# Patient Record
Sex: FEMALE | Race: WHITE | NOT HISPANIC OR LATINO | Employment: FULL TIME | URBAN - NONMETROPOLITAN AREA
[De-identification: names, ages, dates, MRNs, and addresses within clinical notes are randomized per-mention and may not be internally consistent; named-entity substitution may affect disease eponyms.]

---

## 2017-07-07 ENCOUNTER — TRANSCRIBE ORDERS (OUTPATIENT)
Dept: ADMINISTRATIVE | Facility: HOSPITAL | Age: 37
End: 2017-07-07

## 2017-07-07 DIAGNOSIS — N63.0 BREAST LUMP: Primary | ICD-10-CM

## 2017-07-11 ENCOUNTER — HOSPITAL ENCOUNTER (OUTPATIENT)
Dept: ULTRASOUND IMAGING | Facility: HOSPITAL | Age: 37
Discharge: HOME | End: 2017-07-11
Attending: OBSTETRICS & GYNECOLOGY | Admitting: OBSTETRICS & GYNECOLOGY

## 2017-07-11 DIAGNOSIS — N63.0 BREAST LUMP: ICD-10-CM

## 2017-07-11 PROCEDURE — 76642 ULTRASOUND BREAST LIMITED: CPT

## 2017-07-11 PROCEDURE — 76642 ULTRASOUND BREAST LIMITED: CPT | Performed by: RADIOLOGY

## 2017-07-12 ENCOUNTER — HOSPITAL ENCOUNTER (EMERGENCY)
Facility: HOSPITAL | Age: 37
Discharge: HOME | End: 2017-07-12
Attending: EMERGENCY MEDICINE | Admitting: EMERGENCY MEDICINE

## 2017-07-12 ENCOUNTER — APPOINTMENT (OUTPATIENT)
Dept: CT IMAGING | Facility: HOSPITAL | Age: 37
End: 2017-07-12

## 2017-07-12 VITALS
DIASTOLIC BLOOD PRESSURE: 74 MMHG | OXYGEN SATURATION: 98 % | HEIGHT: 63 IN | BODY MASS INDEX: 19.49 KG/M2 | TEMPERATURE: 97.8 F | WEIGHT: 110 LBS | RESPIRATION RATE: 16 BRPM | HEART RATE: 88 BPM | SYSTOLIC BLOOD PRESSURE: 112 MMHG

## 2017-07-12 DIAGNOSIS — G44.209 TENSION HEADACHE: Primary | ICD-10-CM

## 2017-07-12 DIAGNOSIS — G24.5 BLEPHAROSPASM: ICD-10-CM

## 2017-07-12 LAB
6-ACETYL MORPHINE: NEGATIVE
ALBUMIN SERPL-MCNC: 5 G/DL (ref 3.5–5)
ALBUMIN/GLOB SERPL: 1.7 G/DL (ref 1.5–2.5)
ALP SERPL-CCNC: 73 U/L (ref 35–104)
ALT SERPL W P-5'-P-CCNC: 19 U/L (ref 10–36)
AMPHET+METHAMPHET UR QL: NEGATIVE
ANION GAP SERPL CALCULATED.3IONS-SCNC: 5.3 MMOL/L (ref 3.6–11.2)
AST SERPL-CCNC: 17 U/L (ref 10–30)
B-HCG UR QL: NEGATIVE
BACTERIA UR QL AUTO: ABNORMAL /HPF
BARBITURATES UR QL SCN: NEGATIVE
BASOPHILS # BLD AUTO: 0.02 10*3/MM3 (ref 0–0.3)
BASOPHILS NFR BLD AUTO: 0.2 % (ref 0–2)
BENZODIAZ UR QL SCN: NEGATIVE
BILIRUB SERPL-MCNC: 0.9 MG/DL (ref 0.2–1.8)
BILIRUB UR QL STRIP: NEGATIVE
BUN BLD-MCNC: 8 MG/DL (ref 7–21)
BUN/CREAT SERPL: 10.7 (ref 7–25)
BUPRENORPHINE SERPL-MCNC: NEGATIVE NG/ML
CALCIUM SPEC-SCNC: 9.8 MG/DL (ref 7.7–10)
CANNABINOIDS SERPL QL: NEGATIVE
CHLORIDE SERPL-SCNC: 106 MMOL/L (ref 99–112)
CLARITY UR: CLEAR
CO2 SERPL-SCNC: 29.7 MMOL/L (ref 24.3–31.9)
COCAINE UR QL: NEGATIVE
COLOR UR: YELLOW
CREAT BLD-MCNC: 0.75 MG/DL (ref 0.43–1.29)
DEPRECATED RDW RBC AUTO: 44.2 FL (ref 37–54)
EOSINOPHIL # BLD AUTO: 0.28 10*3/MM3 (ref 0–0.7)
EOSINOPHIL NFR BLD AUTO: 3 % (ref 0–5)
ERYTHROCYTE [DISTWIDTH] IN BLOOD BY AUTOMATED COUNT: 13 % (ref 11.5–14.5)
GFR SERPL CREATININE-BSD FRML MDRD: 87 ML/MIN/1.73
GLOBULIN UR ELPH-MCNC: 3 GM/DL
GLUCOSE BLD-MCNC: 93 MG/DL (ref 70–110)
GLUCOSE UR STRIP-MCNC: NEGATIVE MG/DL
HCT VFR BLD AUTO: 46.2 % (ref 37–47)
HGB BLD-MCNC: 15.8 G/DL (ref 12–16)
HGB UR QL STRIP.AUTO: ABNORMAL
HYALINE CASTS UR QL AUTO: ABNORMAL /LPF
IMM GRANULOCYTES # BLD: 0.02 10*3/MM3 (ref 0–0.03)
IMM GRANULOCYTES NFR BLD: 0.2 % (ref 0–0.5)
KETONES UR QL STRIP: NEGATIVE
LEUKOCYTE ESTERASE UR QL STRIP.AUTO: ABNORMAL
LYMPHOCYTES # BLD AUTO: 2.37 10*3/MM3 (ref 1–3)
LYMPHOCYTES NFR BLD AUTO: 25.2 % (ref 21–51)
MCH RBC QN AUTO: 31.8 PG (ref 27–33)
MCHC RBC AUTO-ENTMCNC: 34.2 G/DL (ref 33–37)
MCV RBC AUTO: 93 FL (ref 80–94)
METHADONE UR QL SCN: NEGATIVE
MONOCYTES # BLD AUTO: 0.51 10*3/MM3 (ref 0.1–0.9)
MONOCYTES NFR BLD AUTO: 5.4 % (ref 0–10)
NEUTROPHILS # BLD AUTO: 6.19 10*3/MM3 (ref 1.4–6.5)
NEUTROPHILS NFR BLD AUTO: 66 % (ref 30–70)
NITRITE UR QL STRIP: NEGATIVE
OPIATES UR QL: NEGATIVE
OSMOLALITY SERPL CALC.SUM OF ELEC: 279.3 MOSM/KG (ref 273–305)
OXYCODONE UR QL SCN: NEGATIVE
PCP UR QL SCN: NEGATIVE
PH UR STRIP.AUTO: 6 [PH] (ref 5–8)
PLATELET # BLD AUTO: 232 10*3/MM3 (ref 130–400)
PMV BLD AUTO: 11.7 FL (ref 6–10)
POTASSIUM BLD-SCNC: 4 MMOL/L (ref 3.5–5.3)
PROT SERPL-MCNC: 8 G/DL (ref 6–8)
PROT UR QL STRIP: NEGATIVE
RBC # BLD AUTO: 4.97 10*6/MM3 (ref 4.2–5.4)
RBC # UR: ABNORMAL /HPF
REF LAB TEST METHOD: ABNORMAL
SODIUM BLD-SCNC: 141 MMOL/L (ref 135–153)
SP GR UR STRIP: 1.01 (ref 1–1.03)
SQUAMOUS #/AREA URNS HPF: ABNORMAL /HPF
UROBILINOGEN UR QL STRIP: ABNORMAL
WBC NRBC COR # BLD: 9.39 10*3/MM3 (ref 4.5–12.5)
WBC UR QL AUTO: ABNORMAL /HPF

## 2017-07-12 PROCEDURE — 80307 DRUG TEST PRSMV CHEM ANLYZR: CPT | Performed by: PHYSICIAN ASSISTANT

## 2017-07-12 PROCEDURE — 81001 URINALYSIS AUTO W/SCOPE: CPT | Performed by: PHYSICIAN ASSISTANT

## 2017-07-12 PROCEDURE — 99283 EMERGENCY DEPT VISIT LOW MDM: CPT

## 2017-07-12 PROCEDURE — 81025 URINE PREGNANCY TEST: CPT | Performed by: PHYSICIAN ASSISTANT

## 2017-07-12 PROCEDURE — 85025 COMPLETE CBC W/AUTO DIFF WBC: CPT | Performed by: PHYSICIAN ASSISTANT

## 2017-07-12 PROCEDURE — 70450 CT HEAD/BRAIN W/O DYE: CPT | Performed by: RADIOLOGY

## 2017-07-12 PROCEDURE — 70450 CT HEAD/BRAIN W/O DYE: CPT

## 2017-07-12 PROCEDURE — 80053 COMPREHEN METABOLIC PANEL: CPT | Performed by: PHYSICIAN ASSISTANT

## 2017-07-12 PROCEDURE — 96360 HYDRATION IV INFUSION INIT: CPT

## 2017-07-12 RX ORDER — SODIUM CHLORIDE 0.9 % (FLUSH) 0.9 %
10 SYRINGE (ML) INJECTION AS NEEDED
Status: DISCONTINUED | OUTPATIENT
Start: 2017-07-12 | End: 2017-07-12 | Stop reason: HOSPADM

## 2017-07-12 RX ADMIN — SODIUM CHLORIDE 1000 ML: 0.9 INJECTION, SOLUTION INTRAVENOUS at 18:50

## 2017-07-12 NOTE — ED NOTES
"Patient states \" jewel been under a lot of stress lately, i work full time at [employer] and i have a teenager and a todler at home, I got a headache on Tuesday and i get migraines so i just try to sleep them off but this one didn't go away when i woke up so i took motrin and than my eyes started twitching\". Provider aware.       Pallavi Murguia RN  07/12/17 6412    "

## 2017-07-13 NOTE — DISCHARGE INSTRUCTIONS
Recommend if you keep having the eye twitching to ask your primary care provider to refer you to a Neurologist for further evaluation.

## 2017-07-13 NOTE — ED PROVIDER NOTES
Subjective   Patient is a 36 y.o. female presenting with headaches.   History provided by:  Patient   used: No    Headache   Pain location:  Generalized  Quality:  Dull  Radiates to:  Does not radiate  Duration:  2 days  Timing:  Rare  Progression:  Resolved  Chronicity:  Recurrent  Similar to prior headaches: yes    Context: emotional stress    Context: not activity, not exposure to bright light and not caffeine    Relieved by:  Acetaminophen and NSAIDs  Worsened by:  Nothing  Ineffective treatments:  None tried  Associated symptoms: no abdominal pain, no blurred vision, no cough, no dizziness and no fever    Risk factors: sedentary lifestyle        Review of Systems   Constitutional: Negative.  Negative for fever.   HENT:        (+) eye twitching   Eyes: Negative for blurred vision.   Respiratory: Negative.  Negative for cough.    Cardiovascular: Negative.  Negative for chest pain.   Gastrointestinal: Negative.  Negative for abdominal pain.   Endocrine: Negative.    Genitourinary: Negative.  Negative for dysuria.   Skin: Negative.    Neurological: Positive for headaches. Negative for dizziness.   Psychiatric/Behavioral: Negative.    All other systems reviewed and are negative.      Past Medical History:   Diagnosis Date   • Arthritis    • Asthma    • Cancer    • COPD (chronic obstructive pulmonary disease)    • Heart murmur    • Heart murmur     AS A CHILD   • History of transfusion    • Injury of back    • Ovarian cyst    • Pneumothorax    • Sciatica        Allergies   Allergen Reactions   • Amoxil [Amoxicillin]    • Cephalosporins    • Penicillins        Past Surgical History:   Procedure Laterality Date   • APPENDECTOMY N/A 8/11/2016    Procedure: APPENDECTOMY LAPAROSCOPIC;  Surgeon: Carlos Molina MD;  Location: [facility];  Service:    • CHEST TUBE INSERTION     • DIAGNOSTIC LAPAROSCOPY N/A 8/11/2016    Procedure: DIAGNOSTIC LAPAROSCOPY, LEFT SALPINGOOPHERECTOMY;  Surgeon: Beka Forde  DO Raymundo;  Location: [facility] OR;  Service:    • DILATATION AND CURETTAGE     • GYNECOLOGIC CRYOSURGERY      CERVICAL   • KNEE SURGERY Left    • OTHER SURGICAL HISTORY      external benign vaginal cyst removal   • PILONIDAL CYSTECTOMY     • WISDOM TOOTH EXTRACTION         History reviewed. No pertinent family history.    Social History     Social History   • Marital status:      Spouse name: N/A   • Number of children: N/A   • Years of education: N/A     Social History Main Topics   • Smoking status: Current Every Day Smoker     Packs/day: 1.00     Years: 20.00     Types: Cigarettes   • Smokeless tobacco: None   • Alcohol use No   • Drug use: No      Comment: smoked some pot as a teenager   • Sexual activity: Defer     Other Topics Concern   • None     Social History Narrative           Objective   Physical Exam   Constitutional: She is oriented to person, place, and time. She appears well-developed and well-nourished. No distress.   HENT:   Head: Normocephalic and atraumatic.   Right Ear: External ear normal.   Left Ear: External ear normal.   Nose: Nose normal.   Eyes: Conjunctivae, EOM and lids are normal. Pupils are equal, round, and reactive to light. Lids are everted and swept, no foreign bodies found.   Bilateral eye twitching   Neck: Normal range of motion. Neck supple. No JVD present. No tracheal deviation present.   Cardiovascular: Normal rate, regular rhythm and normal heart sounds.    No murmur heard.  Pulmonary/Chest: Effort normal and breath sounds normal. No respiratory distress. She has no wheezes.   Abdominal: Soft. Bowel sounds are normal. There is no tenderness.   Musculoskeletal: Normal range of motion. She exhibits no edema or deformity.   Neurological: She is alert and oriented to person, place, and time. No cranial nerve deficit.   Skin: Skin is warm and dry. No rash noted. She is not diaphoretic. No erythema. No pallor.   Psychiatric: She has a normal mood and affect. Her behavior is  normal. Thought content normal.   Nursing note and vitals reviewed.      Procedures         ED Course  ED Course   Value Comment By Time   CT Head Without Contrast Per [facility], there is no acute findings. Maite Carrera PA-C 07/12 2025                  [facility]    Final diagnoses:   Tension headache   Blepharospasm            Maite Carrera PA-C  07/13/17 0350

## 2017-07-31 ENCOUNTER — OFFICE VISIT (OUTPATIENT)
Dept: FAMILY MEDICINE CLINIC | Facility: CLINIC | Age: 37
End: 2017-07-31

## 2017-07-31 VITALS
DIASTOLIC BLOOD PRESSURE: 72 MMHG | WEIGHT: 110 LBS | HEIGHT: 63 IN | HEART RATE: 94 BPM | BODY MASS INDEX: 19.49 KG/M2 | SYSTOLIC BLOOD PRESSURE: 118 MMHG | OXYGEN SATURATION: 99 %

## 2017-07-31 DIAGNOSIS — G43.709 CHRONIC MIGRAINE WITHOUT AURA WITHOUT STATUS MIGRAINOSUS, NOT INTRACTABLE: ICD-10-CM

## 2017-07-31 DIAGNOSIS — J30.89 SEASONAL ALLERGIC RHINITIS DUE TO OTHER ALLERGIC TRIGGER: ICD-10-CM

## 2017-07-31 DIAGNOSIS — Z86.79 HISTORY OF HEART MURMUR IN CHILDHOOD: Primary | ICD-10-CM

## 2017-07-31 PROCEDURE — 99204 OFFICE O/P NEW MOD 45 MIN: CPT | Performed by: FAMILY MEDICINE

## 2017-07-31 RX ORDER — ACETAMINOPHEN, ASPIRIN AND CAFFEINE 250; 250; 65 MG/1; MG/1; MG/1
1 TABLET, FILM COATED ORAL EVERY 6 HOURS PRN
COMMUNITY

## 2017-07-31 RX ORDER — NORGESTREL-ETHINYL ESTRADIOL 0.3-0.03MG
TABLET ORAL
COMMUNITY
Start: 2017-07-13 | End: 2020-11-16

## 2017-07-31 RX ORDER — LORATADINE 10 MG/1
10 TABLET ORAL DAILY
COMMUNITY
End: 2017-09-13

## 2017-07-31 RX ORDER — SUMATRIPTAN 50 MG/1
TABLET, FILM COATED ORAL
Qty: 10 TABLET | Refills: 0 | Status: SHIPPED | OUTPATIENT
Start: 2017-07-31 | End: 2017-10-23 | Stop reason: SDUPTHER

## 2017-07-31 RX ORDER — MONTELUKAST SODIUM 10 MG/1
10 TABLET ORAL NIGHTLY
Qty: 30 TABLET | Refills: 2 | Status: SHIPPED | OUTPATIENT
Start: 2017-07-31 | End: 2017-10-23 | Stop reason: SDUPTHER

## 2017-07-31 RX ORDER — LEVOCETIRIZINE DIHYDROCHLORIDE 5 MG/1
5 TABLET, FILM COATED ORAL EVERY EVENING
Qty: 30 TABLET | Refills: 2 | Status: SHIPPED | OUTPATIENT
Start: 2017-07-31 | End: 2017-10-23 | Stop reason: SDUPTHER

## 2017-07-31 RX ORDER — DIPHENHYDRAMINE HCL 25 MG
25 CAPSULE ORAL EVERY 6 HOURS PRN
COMMUNITY
End: 2018-05-29

## 2017-07-31 RX ORDER — TOPIRAMATE 50 MG/1
50 TABLET, FILM COATED ORAL DAILY
Qty: 30 TABLET | Refills: 2 | Status: SHIPPED | OUTPATIENT
Start: 2017-07-31 | End: 2018-01-17

## 2017-08-06 PROBLEM — J30.2 SEASONAL ALLERGIC RHINITIS: Status: ACTIVE | Noted: 2017-08-06

## 2017-08-07 NOTE — PROGRESS NOTES
"Ruthie Mederos     VITALS: Blood pressure 118/72, pulse 94, height 63\" (160 cm), weight 110 lb (49.9 kg), last menstrual period 07/08/2017, SpO2 99 %, not currently breastfeeding.    Subjective  Chief Complaint:   Chief Complaint   Patient presents with   • Establish Care   • Headache        History of Present Illness:  Patient is a 36 y.o.  female with a medical history significant for migraines and allergic rhintiis who presents to clinic secondary to establishment of care. No new or acute concerns today. Doing okay.    Patient has a history of allergic rhinitis and is currently on benadryl 25 mg orally QID as needed. Denies any side effects of the medication. Positive sinus congestion, sinus headaches, watery eyes, itchy eyes, rhinorrhea, coughing, or postnasal discharge.   Allergy injections:  No    Patient has a history of COPD and is currently on no medications. Denies any side effects of the medications. Denies any shortness of breath, coughing, wheezing, or night coughing.  Exacerbation: No  Last utilized albuterol: ??    Patient has a history of anxiety and is currently on no medications. Patient states that she is sleeping fairly well and can get out of bed daily to accomplish daily activities. Patient has no anhedonia. Mood is stable. Has no feelings of guilt. Has fair concentration and memory ability. Has energy. Is able to make goals. Is not crying a lot. Appetite is good. Denies any suicidal or homicidal ideations. Does not have any auditory or visual hallucinations. ?? controlled medication seeking behavior. [facility] has no record of controlled medication seeking behavior. ([facility] number: [id number]) . Last UDS was done today.    Patient does have a history of migraines. She states that she has migraines about 1-2 times a week. She states that she takes excedrin as needed. She is vague about her migraine symptoms - she states that usually the headaches start over the front of her head. Occasionally they " start behind the eye. She states that they are throbbing and pounding. She has to lay down in a dark room before they go away. She admits that sometimes the headaches may be secondary to her allergies. Medications make her headaches better. Changes in weather make her headaches worse. Denies any auras. Not tied ot her period.     Patient has a history of a heart murmur since childhood. Patient states that she has been told previously that her heart murmur was benign. She does not have a cardiologist. Does not recall when she last had an echo. Does have shortness of breath which she has been told is COPD. She states that after a long day, she does have pedal edema. It does alleviate in the AM. Occasional dizziness. There is a history of CAD in the family.   History of stroke: No    The following portions of the patient's history were reviewed and updated as appropriate: allergies, current medications, past family history, past medical history, past social history, past surgical history and problem list.    Past Medical History  Past Medical History:   Diagnosis Date   • Anxiety    • Arthritis    • Asthma    • Cancer    • COPD (chronic obstructive pulmonary disease)    • Falls    • Heart murmur     As a child   • History of transfusion    • Injury of back    • Migraines    • Ovarian cyst    • Pneumothorax    • Sciatica of left side    • Sinusitis    • Tobacco abuse        Review of Systems  Constitutional: Denies any recent history of fevers, chills, itching.  Eyes: Denies any changes in vision. Denies any blurry vision or diplopia.  Ears, Nose, Mouth, Throat: Denies any sore throat, rhinorrhea, or cough.  Cardiovascular: Denies any chest pain, pressure, or palpitations.  Respiratory: Denies any wheezing.  Gastrointestinal: Denies any abdominal pain, nausea, vomiting, diarrhea, or constipation.  Genitourinary: Denies any changes in urination.  Musculoskeletal: Denies any muscle weakness.  Skin and/or breasts: Denies  any rashes.  Neurological: Denies any changes in balance or gait.  Psychiatric: Denies any depression or insomnia. Denies any suicidal or homicidal ideations.  Endocrine: Denies any heat or cold intolerance. Denies any voice changes, polydipsia, or polyuria.  Hematologic/Lymphatic: Denies any anemia or easy bruising.    Surgical History  Past Surgical History:   Procedure Laterality Date   • APPENDECTOMY N/A 8/11/2016    Procedure: APPENDECTOMY LAPAROSCOPIC;  Surgeon: Carlos Molina MD;  Location: [facility];  Service:    • CHEST TUBE INSERTION     • DIAGNOSTIC LAPAROSCOPY N/A 8/11/2016    Procedure: DIAGNOSTIC LAPAROSCOPY, LEFT SALPINGOOPHERECTOMY;  Surgeon: Beka Fonseca DO;  Location: [facility];  Service:    • DILATATION AND CURETTAGE     • GYNECOLOGIC CRYOSURGERY      CERVICAL   • KNEE SURGERY Left    • OTHER SURGICAL HISTORY      external benign vaginal cyst removal   • PILONIDAL CYSTECTOMY     • WISDOM TOOTH EXTRACTION         Family History  Family History   Problem Relation Age of Onset   • Hypertension Mother    • COPD Mother    • Arthritis Mother    • Depression Mother    • Heart disease Father    • Hypertension Father    • Arthritis Father    • No Known Problems Daughter    • No Known Problems Son    • Thyroid disease Maternal Aunt    • Heart disease Maternal Uncle    • Nephrolithiasis Maternal Uncle    • Heart disease Maternal Grandmother    • Stroke Maternal Grandmother    • Hypertension Maternal Grandmother    • Diabetes Maternal Grandmother    • Cancer Maternal Grandmother    • COPD Maternal Grandmother    • Nephrolithiasis Maternal Grandmother    • Hypertension Maternal Grandfather    • COPD Maternal Grandfather    • Nephrolithiasis Maternal Grandfather        Social History  Social History     Social History   • Marital status:      Spouse name: N/A   • Number of children: N/A   • Years of education: N/A     Occupational History   • Not on file.     Social History Main Topics   •  Smoking status: Current Every Day Smoker     Packs/day: 1.00     Years: 20.00     Types: Cigarettes   • Smokeless tobacco: Not on file   • Alcohol use No   • Drug use: No      Comment: smoked some pot as a teenager   • Sexual activity: Defer     Other Topics Concern   • Not on file     Social History Narrative       Objective  Physical Exam  Gen: Patient in NAD. Pleasant and answers appropriately. A&Ox3.    Skin: Warm and dry with normal turgor. No purpura, rashes, or unusual pigmentation noted. Hair is normal in appearance and distribution.    HEENT: NC/AT. No lesions noted. Conjunctiva clear, sclera nonicteric. PERRL. EOMI without nystagmus or strabismus. Fundi appear benign. No hemorrhages or exudates of eyes. Auditory canals are patent bilaterally without lesions. TMs intact, nonerythematous, nonbulging without lesions. Nasal mucosa pink, nonerythematous, and nonedematous. No nasal polyps or other lesions noted. Frontal and maxillary sinuses are nontender. O/P nonerythematous and moist without exudate.    Neck: Supple without lymph nodes palpated. FROM. No evidence of tracheal deviation or thyromegaly. Carotid pulses 2+/4 B/L without bruits.    Lungs: CTA B/L without rales, rhonchi, crackles, or wheezes.    Heart: RRR. S1 and S2 normal. No S3 or S4. No MRGT. (+) 2/6 systolic murmur heard over upper chest.    Abd: Soft, nontender,nondistended. (+)BSx4 quadrants. No scars or abnormalities noted. No HSM, masses, or bruits noted.    Extrem: No CCE. Radial pulses 2+/4 and equal B/L. FROMx4. No bone, joint, or muscle tenderness noted.    Neuro: CNs II-XII grossly intact. Speech, memory, and expression WNL. Muscle strength 5/5. DTRs 2+/4 and equal in both UE and LE B/L. No muscle atrophy or involuntary movement noted. Cerebellar function is intact. No focal motor/sensory deficits.    Procedures    Assessment/Plan  Ruthie Mederos is a 36 y.o. here for establishment of care.  Diagnoses and all orders for this  visit:    History of heart murmur in childhood  -     Adult Transthoracic Echo Complete  Secondary to current murmur, shortness of breath, and pedal edema, will check echo.    Chronic migraine without aura without status migrainosus, not intractable  -     topiramate (TOPAMAX) 50 MG tablet; Take 1 tablet by mouth Daily.  -     SUMAtriptan (IMITREX) 50 MG tablet; Take one tablet at onset of headache. May repeat dose one time in 2 hours if headache not relieved.  Will try topamax 50 mg orally daily for prevention. Patient also given sumatriptan for abortive purposes. Discussed with patient correct usage of excedrin. Headaches may also be secondary to allergic rhinitis.    Seasonal allergic rhinitis due to other allergic trigger  -     montelukast (SINGULAIR) 10 MG tablet; Take 1 tablet by mouth Every Night.  -     levocetirizine (XYZAL) 5 MG tablet; Take 1 tablet by mouth Every Evening.  Patient to discontinue benadryl. She declines nasal sprays. Will try singulair 10 mg orally daily and xyzal 5 mg orally daily.     COPD  May need PFTs.    Preventative Care  WWE with Dr. Fonseca 4/2017 WNL.     Findings and plans discussed with patient who verbalizes understanding and agreement. Will followup with patient once results are in. Patient to followup at clinic PRN or in one month for medical followup.    Francheska Duran MD

## 2017-08-15 ENCOUNTER — HOSPITAL ENCOUNTER (OUTPATIENT)
Dept: CARDIOLOGY | Facility: HOSPITAL | Age: 37
Discharge: HOME | End: 2017-08-15
Admitting: FAMILY MEDICINE

## 2017-08-15 LAB
BH CV ECHO MEAS - % IVS THICK: 44.4 %
BH CV ECHO MEAS - % LVPW THICK: 95.2 %
BH CV ECHO MEAS - ACS: 1.7 CM
BH CV ECHO MEAS - AO ROOT AREA (BSA CORRECTED): 1.7
BH CV ECHO MEAS - AO ROOT AREA: 5 CM^2
BH CV ECHO MEAS - AO ROOT DIAM: 2.5 CM
BH CV ECHO MEAS - BSA(HAYCOCK): 1.5 M^2
BH CV ECHO MEAS - BSA: 1.5 M^2
BH CV ECHO MEAS - BZI_BMI: 19.5 KILOGRAMS/M^2
BH CV ECHO MEAS - BZI_METRIC_HEIGHT: 160 CM
BH CV ECHO MEAS - BZI_METRIC_WEIGHT: 49.9 KG
BH CV ECHO MEAS - CONTRAST EF 4CH: 61.6 ML/M^2
BH CV ECHO MEAS - EDV(CUBED): 89.5 ML
BH CV ECHO MEAS - EDV(MOD-SP4): 73 ML
BH CV ECHO MEAS - EDV(TEICH): 91.2 ML
BH CV ECHO MEAS - EF(CUBED): 68.7 %
BH CV ECHO MEAS - EF(MOD-SP4): 61.6 %
BH CV ECHO MEAS - EF(TEICH): 60.4 %
BH CV ECHO MEAS - ESV(CUBED): 28 ML
BH CV ECHO MEAS - ESV(MOD-SP4): 28 ML
BH CV ECHO MEAS - ESV(TEICH): 36.1 ML
BH CV ECHO MEAS - FS: 32.1 %
BH CV ECHO MEAS - IVS/LVPW: 0.86
BH CV ECHO MEAS - IVSD: 0.51 CM
BH CV ECHO MEAS - IVSS: 0.73 CM
BH CV ECHO MEAS - LA DIMENSION: 2.1 CM
BH CV ECHO MEAS - LA/AO: 0.83
BH CV ECHO MEAS - LV DIASTOLIC VOL/BSA (35-75): 48.7 ML/M^2
BH CV ECHO MEAS - LV MASS(C)D: 69.9 GRAMS
BH CV ECHO MEAS - LV MASS(C)DI: 46.6 GRAMS/M^2
BH CV ECHO MEAS - LV MASS(C)S: 76.5 GRAMS
BH CV ECHO MEAS - LV MASS(C)SI: 51 GRAMS/M^2
BH CV ECHO MEAS - LV SYSTOLIC VOL/BSA (12-30): 18.7 ML/M^2
BH CV ECHO MEAS - LVIDD: 4.5 CM
BH CV ECHO MEAS - LVIDS: 3 CM
BH CV ECHO MEAS - LVLD AP4: 7.9 CM
BH CV ECHO MEAS - LVLS AP4: 6 CM
BH CV ECHO MEAS - LVOT AREA (M): 2.5 CM^2
BH CV ECHO MEAS - LVOT AREA: 2.5 CM^2
BH CV ECHO MEAS - LVOT DIAM: 1.8 CM
BH CV ECHO MEAS - LVPWD: 0.59 CM
BH CV ECHO MEAS - LVPWS: 1.2 CM
BH CV ECHO MEAS - MV A MAX VEL: 63.7 CM/SEC
BH CV ECHO MEAS - MV E MAX VEL: 103.7 CM/SEC
BH CV ECHO MEAS - MV E/A: 1.6
BH CV ECHO MEAS - PA ACC SLOPE: 999.9 CM/SEC^2
BH CV ECHO MEAS - PA ACC TIME: 0.11 SEC
BH CV ECHO MEAS - PA PR(ACCEL): 28.3 MMHG
BH CV ECHO MEAS - RVDD: 1.2 CM
BH CV ECHO MEAS - SI(CUBED): 41 ML/M^2
BH CV ECHO MEAS - SI(MOD-SP4): 30 ML/M^2
BH CV ECHO MEAS - SI(TEICH): 36.7 ML/M^2
BH CV ECHO MEAS - SV(CUBED): 61.5 ML
BH CV ECHO MEAS - SV(MOD-SP4): 45 ML
BH CV ECHO MEAS - SV(TEICH): 55.1 ML

## 2017-08-15 PROCEDURE — 93306 TTE W/DOPPLER COMPLETE: CPT

## 2017-08-15 PROCEDURE — 93306 TTE W/DOPPLER COMPLETE: CPT | Performed by: INTERNAL MEDICINE

## 2017-08-16 ENCOUNTER — TELEPHONE (OUTPATIENT)
Dept: FAMILY MEDICINE CLINIC | Facility: CLINIC | Age: 37
End: 2017-08-16

## 2017-08-16 NOTE — TELEPHONE ENCOUNTER
----- Message from Francheska Duran MD sent at 8/15/2017  9:18 PM EDT -----  Please let her know that she does have some mild mitral valve regurgitation on her echo, but otherwise, everything is negative. I will explain more to her when she comes in for her next appointment. This is a monitoring thing for right now. Thanks.        Patient notified & verbalized understanding.

## 2017-08-28 ENCOUNTER — OFFICE VISIT (OUTPATIENT)
Dept: FAMILY MEDICINE CLINIC | Facility: CLINIC | Age: 37
End: 2017-08-28

## 2017-08-28 VITALS
BODY MASS INDEX: 18.82 KG/M2 | RESPIRATION RATE: 20 BRPM | HEIGHT: 63 IN | WEIGHT: 106.2 LBS | DIASTOLIC BLOOD PRESSURE: 80 MMHG | HEART RATE: 80 BPM | SYSTOLIC BLOOD PRESSURE: 121 MMHG | OXYGEN SATURATION: 99 %

## 2017-08-28 DIAGNOSIS — G43.709 CHRONIC MIGRAINE WITHOUT AURA WITHOUT STATUS MIGRAINOSUS, NOT INTRACTABLE: ICD-10-CM

## 2017-08-28 DIAGNOSIS — Z86.79 HISTORY OF HEART MURMUR IN CHILDHOOD: ICD-10-CM

## 2017-08-28 DIAGNOSIS — R06.02 SHORTNESS OF BREATH: Primary | ICD-10-CM

## 2017-08-28 DIAGNOSIS — J30.89 SEASONAL ALLERGIC RHINITIS DUE TO OTHER ALLERGIC TRIGGER: ICD-10-CM

## 2017-08-28 PROCEDURE — 99214 OFFICE O/P EST MOD 30 MIN: CPT | Performed by: FAMILY MEDICINE

## 2017-09-13 NOTE — PROGRESS NOTES
"Ruthie Mederos     VITALS: Blood pressure 121/80, pulse 80, resp. rate 20, height 63\" (160 cm), weight 106 lb 3.2 oz (48.2 kg), SpO2 99 %, not currently breastfeeding.    Subjective  Chief Complaint:   Chief Complaint   Patient presents with   • Heart Murmur     Echo 7/31/17/follow up   • Migraine     Doing better   • Asthma        History of Present Illness:  Patient is a 36 y.o. female with a medical history significant for migraines and allergic rhintiis who presents to clinic secondary to medical followup. No new or acute concerns today. Doing okay. Recent echo 8/2017 benign.     Patient has a history of allergic rhinitis and is currently on singulair 10 mg orally daily and xyzal 5 mg orally daily. Denies any side effects of the medications. Improved symptoms without sinus congestion, sinus headaches, watery eyes, itchy eyes, rhinorrhea, coughing, or postnasal discharge.   Allergy injections:  No    Patient has a history of COPD and is currently on no medications. Denies any side effects of the medications. Having some shortness of breath, coughing, but no wheezing, or night coughing.  Exacerbation: No  Last utilized albuterol: ??    Patient has a history of anxiety and is currently on no medications. Patient states that she is sleeping fairly well and can get out of bed daily to accomplish daily activities. Patient has no anhedonia. Mood is stable. Has no feelings of guilt. Has fair concentration and memory ability. Has energy. Is able to make goals. Is not crying a lot. Appetite is good. Denies any suicidal or homicidal ideations. Does not have any auditory or visual hallucinations. ?? controlled medication seeking behavior. [facility] has no record of controlled medication seeking behavior. ([facility] number: [id number]) . Last UDS was done 8/2017 and was okay.     Patient does have a history of migraines and we started her on topamax 50 mg orally daily along with imitrex as needed when the headaches start. She states " that she has migraines about 1-2 times a week. She states that usually the headaches start over the front of her head. Occasionally they start behind the eye. She states that they are throbbing and pounding. She has to lay down in a dark room before they go away. She admits that sometimes the headaches may be secondary to her allergies. Medications make her headaches better. Changes in weather make her headaches worse. Denies any auras. Not tied ot her period. Secondary to starting on the topamax, her headaches have improved.     Patient has a history of a heart murmur since childhood. Patient states that she has been told previously that her heart murmur was benign. She does not have a cardiologist. Echo 8/2017 benign. Does have shortness of breath which she has been told is COPD. She states that after a long day, she does have pedal edema. It does alleviate in the AM. Occasional dizziness. There is a history of CAD in the family.   History of stroke: No    No complaints about any of the medications.    The following portions of the patient's history were reviewed and updated as appropriate: allergies, current medications, past family history, past medical history, past social history, past surgical history and problem list.    Past Medical History  Past Medical History:   Diagnosis Date   • Anxiety    • Arthritis    • Asthma    • Cancer    • COPD (chronic obstructive pulmonary disease)    • Falls    • Heart murmur     As a child   • History of transfusion    • Injury of back    • Migraines    • Mitral valve regurgitation    • Ovarian cyst    • Pneumothorax    • Sciatica of left side    • Sinusitis    • Tobacco abuse        Review of Systems  Constitutional: Denies any recent history of fevers, chills, itching.  Eyes: Denies any changes in vision. Denies any blurry vision or diplopia.  Ears, Nose, Mouth, Throat: Denies any sore throat, rhinorrhea, or cough.  Cardiovascular: Denies any chest pain, pressure, or  palpitations.  Respiratory: Denies any shortness of breath or wheezing.  Gastrointestinal: Denies any abdominal pain, nausea, vomiting, diarrhea, or constipation.  Genitourinary: Denies any changes in urination.  Musculoskeletal: Denies any muscle weakness.  Skin and/or breasts: Denies any rashes.  Neurological: Denies any changes in balance or gait.  Psychiatric: Denies any anxiety, depression, or insomnia. Denies any suicidal or homicidal ideations.    Surgical History  Past Surgical History:   Procedure Laterality Date   • APPENDECTOMY N/A 8/11/2016    Procedure: APPENDECTOMY LAPAROSCOPIC;  Surgeon: Carlos Molina MD;  Location: [facility];  Service:    • CHEST TUBE INSERTION     • DIAGNOSTIC LAPAROSCOPY N/A 8/11/2016    Procedure: DIAGNOSTIC LAPAROSCOPY, LEFT SALPINGOOPHERECTOMY;  Surgeon: Beka Fonseca DO;  Location: [facility];  Service:    • DILATATION AND CURETTAGE     • GYNECOLOGIC CRYOSURGERY      CERVICAL   • KNEE SURGERY Left    • OTHER SURGICAL HISTORY      external benign vaginal cyst removal   • PILONIDAL CYSTECTOMY     • WISDOM TOOTH EXTRACTION         Family History  Family History   Problem Relation Age of Onset   • Hypertension Mother    • COPD Mother    • Arthritis Mother    • Depression Mother    • Heart disease Father    • Hypertension Father    • Arthritis Father    • No Known Problems Daughter    • No Known Problems Son    • Thyroid disease Maternal Aunt    • Heart disease Maternal Uncle    • Nephrolithiasis Maternal Uncle    • Heart disease Maternal Grandmother    • Stroke Maternal Grandmother    • Hypertension Maternal Grandmother    • Diabetes Maternal Grandmother    • Cancer Maternal Grandmother    • COPD Maternal Grandmother    • Nephrolithiasis Maternal Grandmother    • Hypertension Maternal Grandfather    • COPD Maternal Grandfather    • Nephrolithiasis Maternal Grandfather        Social History  Social History     Social History   • Marital status:      Spouse name: N/A    • Number of children: N/A   • Years of education: N/A     Occupational History   • Not on file.     Social History Main Topics   • Smoking status: Current Every Day Smoker     Packs/day: 1.00     Years: 20.00     Types: Cigarettes   • Smokeless tobacco: Not on file   • Alcohol use No   • Drug use: No      Comment: smoked some pot as a teenager   • Sexual activity: Defer     Other Topics Concern   • Not on file     Social History Narrative       Objective  Physical Exam  Gen: Patient in NAD. Pleasant and answers appropriately. A&Ox3.    Skin: Warm and dry with normal turgor. No purpura, rashes, or unusual pigmentation noted. Hair is normal in appearance and distribution.    HEENT: NC/AT. No lesions noted. Conjunctiva clear, sclera nonicteric. PERRL. EOMI without nystagmus or strabismus. Fundi appear benign. No hemorrhages or exudates of eyes. Auditory canals are patent bilaterally without lesions. TMs intact,  nonerythematous, nonbulging without lesions. Nasal mucosa pink, nonerythematous, and nonedematous. Frontal and maxillary sinuses are nontender. O/P nonerythematous and moist without exudate.    Neck: Supple without lymph nodes palpated. FROM.     Lungs: CTA B/L without rales, rhonchi, crackles, or wheezes.    Heart: RRR. S1 and S2 normal. No S3 or S4. No RGT. (+) 2/6 systolic murmur heard over right upper chest.    Abd: Soft, nontender,nondistended. (+)BSx4 quadrants.     Extrem: No CCE. Radial pulses 2+/4 and equal B/L. FROMx4. No bone, joint, or muscle tenderness noted.    Neuro: No focal motor/sensory deficits.    Procedures    Assessment/Plan  Ruthie Mederos is a 36 y.o. here for medical followup.  Diagnoses and all orders for this visit:    Shortness of breath  -     Pulmonary Function Test    History of heart murmur in childhood  Benign. Echo 8/2017 benign.     Chronic migraine without aura without status migrainosus, not intractable  Will continue topamax 50 mg orally daily for prevention. Patient also  given sumatriptan for abortive purposes. Discussed with patient correct usage of excedrin. Headaches may also be secondary to allergic rhinitis. Continue to monitor.     Seasonal allergic rhinitis due to other allergic trigger  Improved. She declines nasal sprays. Continue singulair 10 mg orally daily and xyzal 5 mg orally daily.       Preventative Care  WWE with Dr. Fonseca 4/2017 WNL.      Findings and plans discussed with patient who verbalizes understanding and agreement. Will followup with patient once results are in. Patient to followup at clinic PRN or in two months for medical followup.  Francheska Duran MD

## 2017-09-28 ENCOUNTER — HOSPITAL ENCOUNTER (OUTPATIENT)
Dept: RESPIRATORY THERAPY | Facility: HOSPITAL | Age: 37
Discharge: HOME | End: 2017-09-28
Admitting: FAMILY MEDICINE

## 2017-09-28 PROCEDURE — 94729 DIFFUSING CAPACITY: CPT

## 2017-09-28 PROCEDURE — 94060 EVALUATION OF WHEEZING: CPT | Performed by: INTERNAL MEDICINE

## 2017-09-28 PROCEDURE — 94727 GAS DIL/WSHOT DETER LNG VOL: CPT

## 2017-09-28 PROCEDURE — 94727 GAS DIL/WSHOT DETER LNG VOL: CPT | Performed by: INTERNAL MEDICINE

## 2017-09-28 PROCEDURE — 94729 DIFFUSING CAPACITY: CPT | Performed by: INTERNAL MEDICINE

## 2017-09-28 PROCEDURE — 94060 EVALUATION OF WHEEZING: CPT

## 2017-10-09 ENCOUNTER — TELEPHONE (OUTPATIENT)
Dept: FAMILY MEDICINE CLINIC | Facility: CLINIC | Age: 37
End: 2017-10-09

## 2017-10-09 NOTE — TELEPHONE ENCOUNTER
----- Message from Francheska Duran MD sent at 10/9/2017  6:57 AM EDT -----  Please let patient know that pulm function was pretty much normal, but they couldn't get a good read on lung volumes? They recommend a repeat study. I'll discuss it more with her at her next appointment. Thanks.        Left a message to return call.    Patient came into the clinic was notified & verbalized understanding.

## 2017-10-23 ENCOUNTER — OFFICE VISIT (OUTPATIENT)
Dept: FAMILY MEDICINE CLINIC | Facility: CLINIC | Age: 37
End: 2017-10-23

## 2017-10-23 VITALS
SYSTOLIC BLOOD PRESSURE: 106 MMHG | BODY MASS INDEX: 19.31 KG/M2 | WEIGHT: 109 LBS | HEIGHT: 63 IN | HEART RATE: 76 BPM | DIASTOLIC BLOOD PRESSURE: 68 MMHG | OXYGEN SATURATION: 93 %

## 2017-10-23 DIAGNOSIS — G43.709 CHRONIC MIGRAINE WITHOUT AURA WITHOUT STATUS MIGRAINOSUS, NOT INTRACTABLE: ICD-10-CM

## 2017-10-23 DIAGNOSIS — J18.9 PNEUMONIA OF BOTH LOWER LOBES DUE TO INFECTIOUS ORGANISM: Primary | ICD-10-CM

## 2017-10-23 DIAGNOSIS — K64.0 GRADE I HEMORRHOIDS: ICD-10-CM

## 2017-10-23 PROCEDURE — 99214 OFFICE O/P EST MOD 30 MIN: CPT | Performed by: FAMILY MEDICINE

## 2017-10-23 RX ORDER — MONTELUKAST SODIUM 10 MG/1
10 TABLET ORAL NIGHTLY
Qty: 30 TABLET | Refills: 2 | Status: SHIPPED | OUTPATIENT
Start: 2017-10-23 | End: 2018-05-14 | Stop reason: SDUPTHER

## 2017-10-23 RX ORDER — PREDNISONE 10 MG/1
TABLET ORAL
Qty: 30 TABLET | Refills: 0 | Status: SHIPPED | OUTPATIENT
Start: 2017-10-23 | End: 2018-01-17

## 2017-10-23 RX ORDER — SUMATRIPTAN 50 MG/1
TABLET, FILM COATED ORAL
Qty: 10 TABLET | Refills: 5 | Status: SHIPPED | OUTPATIENT
Start: 2017-10-23

## 2017-10-23 RX ORDER — DOXYCYCLINE 100 MG/1
100 TABLET ORAL 2 TIMES DAILY
Qty: 20 TABLET | Refills: 0 | Status: SHIPPED | OUTPATIENT
Start: 2017-10-23 | End: 2018-01-17

## 2017-10-23 RX ORDER — LEVOCETIRIZINE DIHYDROCHLORIDE 5 MG/1
5 TABLET, FILM COATED ORAL EVERY EVENING
Qty: 30 TABLET | Refills: 2 | Status: SHIPPED | OUTPATIENT
Start: 2017-10-23 | End: 2018-05-14 | Stop reason: SDUPTHER

## 2017-11-08 NOTE — PROGRESS NOTES
"Ruthie Mederos     VITALS: Blood pressure 106/68, pulse 76, height 63\" (160 cm), weight 109 lb (49.4 kg), SpO2 93 %, not currently breastfeeding.    Subjective  Chief Complaint:   Chief Complaint   Patient presents with   • Follow-up     PFT        History of Present Illness:  Patient is a 36 y.o. female with a medical history significant for migraines and allergic rhintiis who presents to clinic secondary to medical followup. Recent echo 8/2017 benign. She is not feeling well today. She is having a hard time breathing and is having a nonproductive cough. States that she has not had any fevers or chills, sinus congestion, rhinorrhea. No chest pain. No one else around her is sick. Also states that she has hemorrhoids and would like me to take a look at them.     Patient has a history of allergic rhinitis and is currently on singulair 10 mg orally daily and xyzal 5 mg orally daily. Denies any side effects of the medications. Improved symptoms without sinus congestion, sinus headaches, watery eyes, itchy eyes, rhinorrhea, coughing, or postnasal discharge.   Allergy injections:  No    Patient has a history of COPD and is currently on no medications. Denies any side effects of the medications. Having some shortness of breath, coughing, but no wheezing, or night coughing. Was recommended to repeat current PFTs.  Exacerbation: No  Last utilized albuterol: ??    Patient has a history of anxiety and is currently on no medications. Patient states that she is sleeping fairly well and can get out of bed daily to accomplish daily activities. Patient has no anhedonia. Mood is stable. Has no feelings of guilt. Has fair concentration and memory ability. Has energy. Is able to make goals. Is not crying a lot. Appetite is good. Denies any suicidal or homicidal ideations. Does not have any auditory or visual hallucinations. ?? controlled medication seeking behavior. [facility] has no record of controlled medication seeking behavior. " ([facility] number: [id number]) . Last UDS was done 8/2017 and was okay.      Patient does have a history of migraines and we started her on topamax 50 mg orally daily along with imitrex as needed when the headaches start. She has discontinued the topamax because she states that it decreased her urination. It had originally helped. She states that she has migraines about 1-2 times a week. She states that usually the headaches start over the front of her head. Occasionally they start behind the eye. She states that they are throbbing and pounding. She has to lay down in a dark room before they go away. She admits that sometimes the headaches may be secondary to her allergies. Medications make her headaches better. Changes in weather make her headaches worse. Denies any auras. Not tied ot her period.     Patient has a history of a heart murmur since childhood. Patient states that she has been told previously that her heart murmur was benign. She does not have a cardiologist. Echo 8/2017 benign. Does have shortness of breath which she has been told is COPD. She states that after a long day, she does have pedal edema. It does alleviate in the AM. Occasional dizziness. There is a history of CAD in the family.   History of stroke: No     No complaints about any of the medications.    The following portions of the patient's history were reviewed and updated as appropriate: allergies, current medications, past family history, past medical history, past social history, past surgical history and problem list.    Past Medical History  Past Medical History:   Diagnosis Date   • Anxiety    • Arthritis    • Asthma    • Cancer    • COPD (chronic obstructive pulmonary disease)    • Falls    • Heart murmur     As a child   • History of transfusion    • Injury of back    • Migraines    • Mitral valve regurgitation    • Ovarian cyst    • Pneumothorax    • Sciatica of left side    • Sinusitis    • Tobacco abuse        Review of  Systems  Constitutional: Denies any recent history of HAs, dizziness, fevers, chills, itching.  Eyes: Denies any changes in vision. Denies any blurry vision or diplopia.  Ears, Nose, Mouth, Throat: Denies any sore throat, rhinorrhea.  Cardiovascular: Denies any chest pain, pressure, or palpitations.  Gastrointestinal: Denies any abdominal pain, nausea, vomiting, diarrhea, or constipation.  Genitourinary: Denies any changes in urination.  Musculoskeletal: Denies any muscle weakness.  Skin and/or breasts: Denies any rashes.  Neurological: Denies any changes in balance or gait.  Psychiatric: Denies any suicidal or homicidal ideations.    Surgical History  Past Surgical History:   Procedure Laterality Date   • APPENDECTOMY N/A 8/11/2016    Procedure: APPENDECTOMY LAPAROSCOPIC;  Surgeon: Carlos Molina MD;  Location: [facility];  Service:    • CHEST TUBE INSERTION     • DIAGNOSTIC LAPAROSCOPY N/A 8/11/2016    Procedure: DIAGNOSTIC LAPAROSCOPY, LEFT SALPINGOOPHERECTOMY;  Surgeon: Beka Fonseca DO;  Location: [facility];  Service:    • DILATATION AND CURETTAGE     • GYNECOLOGIC CRYOSURGERY      CERVICAL   • KNEE SURGERY Left    • OTHER SURGICAL HISTORY      external benign vaginal cyst removal   • PILONIDAL CYSTECTOMY     • WISDOM TOOTH EXTRACTION         Family History  Family History   Problem Relation Age of Onset   • Hypertension Mother    • COPD Mother    • Arthritis Mother    • Depression Mother    • Heart disease Father    • Hypertension Father    • Arthritis Father    • No Known Problems Daughter    • No Known Problems Son    • Thyroid disease Maternal Aunt    • Heart disease Maternal Uncle    • Nephrolithiasis Maternal Uncle    • Heart disease Maternal Grandmother    • Stroke Maternal Grandmother    • Hypertension Maternal Grandmother    • Diabetes Maternal Grandmother    • Cancer Maternal Grandmother    • COPD Maternal Grandmother    • Nephrolithiasis Maternal Grandmother    • Hypertension Maternal  Grandfather    • COPD Maternal Grandfather    • Nephrolithiasis Maternal Grandfather        Social History  Social History     Social History   • Marital status:      Spouse name: N/A   • Number of children: N/A   • Years of education: N/A     Occupational History   • Not on file.     Social History Main Topics   • Smoking status: Current Every Day Smoker     Packs/day: 1.00     Years: 20.00     Types: Cigarettes   • Smokeless tobacco: Not on file   • Alcohol use No   • Drug use: No      Comment: smoked some pot as a teenager   • Sexual activity: Defer     Other Topics Concern   • Not on file     Social History Narrative       Objective  Physical Exam  Gen: Patient in NAD. Pleasant and answers appropriately. A&Ox3.    Skin: Warm and dry with normal turgor. No purpura, rashes, or unusual pigmentation noted. Hair is normal in appearance and distribution.    HEENT: NC/AT. No lesions noted. Conjunctiva clear, sclera nonicteric. PERRL. EOMI without nystagmus or strabismus. Fundi appear benign. No hemorrhages or exudates of eyes. Auditory canals are patent bilaterally without lesions. TMs intact,  nonerythematous, nonbulging without lesions. Nasal mucosa erythematous, and nonedematous. Frontal and maxillary sinuses are nontender. O/P erythematous and moist without exudate.    Neck: Supple without lymph nodes palpated. FROM.     Lungs: Decreased B/L without rales, rhonchi, or wheezes. Bilateral lower lobe crackles.    Heart: RRR. S1 and S2 normal. No S3 or S4. No RGT.(+) 2/6 systolic murmur heard over right upper chest.    Abd: Soft, nontender,nondistended. (+)BSx4 quadrants.     Extrem: No CCE. Radial pulses 2+/4 and equal B/L. FROMx4. No bone, joint, or muscle tenderness noted.    Neuro: No focal motor/sensory deficits.    Rectal: Nonthrombosed, nonbleeding hemorrhoids at 8 PM and 630 PM. 800 is larger than 630.    Procedures    Assessment/Plan  Ruthie Mederos is a 36 y.o. Female here for medical  followup.  Diagnoses and all orders for this visit:    Pneumonia of both lower lobes due to infectious organism  -     doxycycline (ADOXA) 100 MG tablet; Take 1 tablet by mouth 2 (Two) Times a Day.  -     predniSONE (DELTASONE) 10 MG tablet; Take 40 mg orally daily x 4 days, then 30 mg orally daily x 3 days, then 20 mg daily x 2 days, then 10 mg daily x 1 day.  Will start patient on doxycycline 100 mg orally BID x 10 days along with prednisone taper and three way cough syrup. Continue to monitor. Patient to call if symptoms continue or worsen.     Hemorrhoids  Patient to monitor. Patient to continue Preparation H and Sitz baths.    Shortness of breath  Will repeat Pulmonary Function Test once she is better.     History of heart murmur in childhood  Benign. Echo 8/2017 benign.      Chronic migraine without aura without status migrainosus, not intractable  Discontinue topamax 50 mg orally daily for prevention. Continue sumatriptan for abortive purposes. Discussed with patient correct usage of excedrin. Headaches may also be secondary to allergic rhinitis. Continue to monitor.      Seasonal allergic rhinitis due to other allergic trigger  Improved. She declines nasal sprays. Continue singulair 10 mg orally daily and xyzal 5 mg orally daily.       Preventative Care  WWE with Dr. Fonseca 4/2017 WNL.       Findings and plans discussed with patient who verbalizes understanding and agreement. Will followup with patient once results are in. Patient to followup at clinic PRN or in one month for medical followup.      Other orders  Refilled:  -     levocetirizine (XYZAL) 5 MG tablet; Take 1 tablet by mouth Every Evening.  -     montelukast (SINGULAIR) 10 MG tablet; Take 1 tablet by mouth Every Night.  -     SUMAtriptan (IMITREX) 50 MG tablet; Take one tablet at onset of headache. May repeat dose one time in 2 hours if headache not relieved.    Francheska Duran MD

## 2017-11-13 ENCOUNTER — TELEPHONE (OUTPATIENT)
Dept: FAMILY MEDICINE CLINIC | Facility: CLINIC | Age: 37
End: 2017-11-13

## 2017-11-13 DIAGNOSIS — R06.02 SHORTNESS OF BREATH: Primary | ICD-10-CM

## 2017-11-13 NOTE — TELEPHONE ENCOUNTER
Yes, go ahead and tell her to get the CXR done. I placed it in already. Thanks.      Spoke with patient, she is going to get the CXR done.

## 2017-11-13 NOTE — TELEPHONE ENCOUNTER
PATIENT STATES SHE IS NOT DOING ANY BETTER WITH HER COLD. SAYS THAT PHARMACY INITIALLY GAVE HER SIX OF HER ANTIBIOTICS, THEN THERE WAS A GAP, THEN THEY GAVE HER ANOTHER SIX PILLS, AND THERE WAS ANOTHER GAP. PATIENT WANTS TO KNOW IF SHE NEEDS TO GET A CHEST X-RAY BECAUSE SHE IS NOT FEELING ANY BETTER. CAN SOMEONE PLEASE RETURN HER CALL.

## 2017-11-14 ENCOUNTER — HOSPITAL ENCOUNTER (OUTPATIENT)
Dept: GENERAL RADIOLOGY | Facility: HOSPITAL | Age: 37
Discharge: HOME | End: 2017-11-14
Admitting: FAMILY MEDICINE

## 2017-11-14 PROCEDURE — 71020 XR CHEST 2 VW: CPT | Performed by: RADIOLOGY

## 2017-11-14 PROCEDURE — 71020 HC CHEST PA AND LATERAL: CPT

## 2017-11-15 ENCOUNTER — TELEPHONE (OUTPATIENT)
Dept: FAMILY MEDICINE CLINIC | Facility: CLINIC | Age: 37
End: 2017-11-15

## 2017-11-15 RX ORDER — BUDESONIDE AND FORMOTEROL FUMARATE DIHYDRATE 160; 4.5 UG/1; UG/1
1 AEROSOL RESPIRATORY (INHALATION)
Qty: 10.2 G | Refills: 1 | Status: SHIPPED | OUTPATIENT
Start: 2017-11-15 | End: 2020-11-16

## 2017-11-15 RX ORDER — ALBUTEROL SULFATE 90 UG/1
AEROSOL, METERED RESPIRATORY (INHALATION)
Qty: 18 G | Refills: 2 | Status: SHIPPED | OUTPATIENT
Start: 2017-11-15 | End: 2020-11-16

## 2017-11-15 NOTE — TELEPHONE ENCOUNTER
----- Message from Francheska Duran MD sent at 11/15/2017  4:08 PM EST -----  Please call and let her know that she doesn't have any infections going on. She has some COPD which is when she can breathe in, but she can't breathe out. Her body is trying to get the air out, can't, and therefore she starts coughing. If she is okay with it, go ahead and send in some symbicort 160/4.5  1 puff inh BID, #1 with 1 refill and a rescue albuterol inhaler 2 puffs every 4-6 hours as needed for shortness of breath, #1 with 2 refills. We will see how she is doing on these two next week. Thanks.        Patient notified,agreeable,& verbalized understanding.

## 2017-11-22 ENCOUNTER — OFFICE VISIT (OUTPATIENT)
Dept: FAMILY MEDICINE CLINIC | Facility: CLINIC | Age: 37
End: 2017-11-22

## 2017-11-22 VITALS
DIASTOLIC BLOOD PRESSURE: 74 MMHG | BODY MASS INDEX: 20.02 KG/M2 | WEIGHT: 113 LBS | SYSTOLIC BLOOD PRESSURE: 109 MMHG | OXYGEN SATURATION: 99 % | HEART RATE: 90 BPM | HEIGHT: 63 IN

## 2017-11-22 DIAGNOSIS — J43.8 OTHER EMPHYSEMA (HCC): Primary | ICD-10-CM

## 2017-11-22 PROCEDURE — 99213 OFFICE O/P EST LOW 20 MIN: CPT | Performed by: FAMILY MEDICINE

## 2017-11-22 RX ORDER — PSEUDOEPHEDRINE HCL 120 MG/1
120 TABLET, FILM COATED, EXTENDED RELEASE ORAL EVERY 12 HOURS
Qty: 60 TABLET | Refills: 0 | Status: SHIPPED | OUTPATIENT
Start: 2017-11-22 | End: 2018-01-17

## 2017-11-22 RX ORDER — AZELASTINE HCL 205.5 UG/1
2 SPRAY NASAL 2 TIMES DAILY
Qty: 30 ML | Refills: 2 | Status: SHIPPED | OUTPATIENT
Start: 2017-11-22 | End: 2018-01-17

## 2017-12-06 NOTE — PROGRESS NOTES
"Ruthie Mederos     VITALS: Blood pressure 109/74, pulse 90, height 160 cm (63\"), weight 51.3 kg (113 lb), SpO2 99 %, not currently breastfeeding.    Subjective  Chief Complaint:   Chief Complaint   Patient presents with   • Follow-up        History of Present Illness:  Patient is a 37 y.o.  female who presents to clinic secondary to medical followup.    Patient was seen today for these conditions and concerns:  No new or acute concerns.    No complaints about any of the medications.    The following portions of the patient's history were reviewed and updated as appropriate: allergies, current medications, past family history, past medical history, past social history, past surgical history and problem list.    Past Medical History  Past Medical History:   Diagnosis Date   • Anxiety    • Arthritis    • Asthma    • Cancer    • COPD (chronic obstructive pulmonary disease)    • Falls    • Heart murmur     As a child   • History of transfusion    • Injury of back    • Migraines    • Mitral valve regurgitation    • Ovarian cyst    • Pneumothorax    • Sciatica of left side    • Sinusitis    • Tobacco abuse        Review of Systems  Constitutional: Denies any recent history of HAs, dizziness, fevers, chills, itching.  Eyes: Denies any changes in vision. Denies any blurry vision or diplopia.  Ears, Nose, Mouth, Throat: Denies any sore throat, rhinorrhea, or cough.  Cardiovascular: Denies any chest pain, pressure, or palpitations.  Respiratory: Denies any shortness of breath or wheezing.  Gastrointestinal: Denies any abdominal pain, nausea, vomiting, diarrhea, or constipation.  Genitourinary: Denies any changes in urination.  Musculoskeletal: Denies any muscle weakness.  Skin and/or breasts: Denies any rashes.  Neurological: Denies any changes in balance or gait.  Psychiatric: Denies any anxiety, depression, or insomnia. Denies any suicidal or homicidal ideations.  Endocrine: Denies any heat or cold intolerance. Denies any " voice changes, polydipsia, or polyuria.  Hematologic/Lymphatic: Denies any anemia or easy bruising.    Surgical History  Past Surgical History:   Procedure Laterality Date   • APPENDECTOMY N/A 8/11/2016    Procedure: APPENDECTOMY LAPAROSCOPIC;  Surgeon: Carlos Molina MD;  Location: [facility];  Service:    • CHEST TUBE INSERTION     • DIAGNOSTIC LAPAROSCOPY N/A 8/11/2016    Procedure: DIAGNOSTIC LAPAROSCOPY, LEFT SALPINGOOPHERECTOMY;  Surgeon: Beka Fonseca DO;  Location: [facility];  Service:    • DILATATION AND CURETTAGE     • GYNECOLOGIC CRYOSURGERY      CERVICAL   • KNEE SURGERY Left    • OTHER SURGICAL HISTORY      external benign vaginal cyst removal   • PILONIDAL CYSTECTOMY     • WISDOM TOOTH EXTRACTION         Family History  Family History   Problem Relation Age of Onset   • Hypertension Mother    • COPD Mother    • Arthritis Mother    • Depression Mother    • Heart disease Father    • Hypertension Father    • Arthritis Father    • No Known Problems Daughter    • No Known Problems Son    • Thyroid disease Maternal Aunt    • Heart disease Maternal Uncle    • Nephrolithiasis Maternal Uncle    • Heart disease Maternal Grandmother    • Stroke Maternal Grandmother    • Hypertension Maternal Grandmother    • Diabetes Maternal Grandmother    • Cancer Maternal Grandmother    • COPD Maternal Grandmother    • Nephrolithiasis Maternal Grandmother    • Hypertension Maternal Grandfather    • COPD Maternal Grandfather    • Nephrolithiasis Maternal Grandfather        Social History  Social History     Social History   • Marital status:      Spouse name: N/A   • Number of children: N/A   • Years of education: N/A     Occupational History   • Not on file.     Social History Main Topics   • Smoking status: Current Every Day Smoker     Packs/day: 1.00     Years: 20.00     Types: Cigarettes   • Smokeless tobacco: Never Used   • Alcohol use No   • Drug use: No      Comment: smoked some pot as a teenager   •  Sexual activity: Defer     Other Topics Concern   • Not on file     Social History Narrative       Objective  Physical Exam  Gen: Patient in NAD. Pleasant and answers appropriately. A&Ox3.    Skin: Warm and dry with normal turgor. No purpura, rashes, or unusual pigmentation noted. Hair is normal in appearance and distribution.    HEENT: NC/AT. No lesions noted. Conjunctiva clear, sclera nonicteric. PERRL. EOMI without nystagmus or strabismus. Fundi appear benign. No hemorrhages or exudates of eyes. Auditory canals are patent bilaterally without lesions. TMs intact,  nonerythematous, nonbulging without lesions. Nasal mucosa pink, nonerythematous, and nonedematous. Frontal and maxillary sinuses are nontender. O/P nonerythematous and moist without exudate.    Neck: Supple without lymph nodes palpated. FROM.     Lungs: CTA B/L without rales, rhonchi, crackles, or wheezes.    Heart: RRR. S1 and S2 normal. No S3 or S4. No MRGT.    Abd: Soft, nontender,nondistended. (+)BSx4 quadrants.     Extrem: No CCE. Radial pulses 2+/4 and equal B/L. FROMx4. No bone, joint, or muscle tenderness noted.    Neuro: No focal motor/sensory deficits.    Procedures    Assessment/Plan  Ruthie Mederos is a 37 y.o. here for medical followup.  COPD  Samples given.    Findings and plans discussed with patient who verbalizes understanding and agreement. Will followup with patient once results are in. Patient to followup at clinic PRN or in one month for further medical followup.    Francheska Duran MD

## 2018-01-02 ENCOUNTER — OFFICE VISIT (OUTPATIENT)
Dept: FAMILY MEDICINE CLINIC | Facility: CLINIC | Age: 38
End: 2018-01-02

## 2018-01-02 VITALS
OXYGEN SATURATION: 98 % | BODY MASS INDEX: 19.31 KG/M2 | HEART RATE: 74 BPM | WEIGHT: 109 LBS | HEIGHT: 63 IN | DIASTOLIC BLOOD PRESSURE: 72 MMHG | SYSTOLIC BLOOD PRESSURE: 110 MMHG

## 2018-01-02 DIAGNOSIS — R06.02 SHORTNESS OF BREATH: ICD-10-CM

## 2018-01-02 DIAGNOSIS — G43.709 CHRONIC MIGRAINE WITHOUT AURA WITHOUT STATUS MIGRAINOSUS, NOT INTRACTABLE: ICD-10-CM

## 2018-01-02 DIAGNOSIS — J30.2 CHRONIC SEASONAL ALLERGIC RHINITIS DUE TO OTHER ALLERGEN: Primary | ICD-10-CM

## 2018-01-02 PROCEDURE — 99214 OFFICE O/P EST MOD 30 MIN: CPT | Performed by: FAMILY MEDICINE

## 2018-01-17 NOTE — PROGRESS NOTES
"Ruthie Mederos     VITALS: Blood pressure 110/72, pulse 74, height 160 cm (62.99\"), weight 49.4 kg (109 lb), SpO2 98 %, not currently breastfeeding.    Subjective  Chief Complaint:   Chief Complaint   Patient presents with   • Follow-up        History of Present Illness:  Patient is a 37 y.o. female with a medical history significant for migraines and allergic rhintiis who presents to clinic secondary to medical followup. Recent echo 8/2017 benign. She is not feeling well today. She continues to have allergy symptoms including congestion and itchy eyes. Has tried different nasal sprays without success. Symptoms worse in August - Feb.    Patient has a history of allergic rhinitis and is currently on singulair 10 mg orally daily and xyzal 5 mg orally daily. Denies any side effects of the medications. Positive sinus congestion, sinus headaches, watery eyes, itchy eyes, rhinorrhea, coughing, or postnasal discharge.   Allergy injections:  No    Patient has a history of COPD and is currently on no medications. Denies any side effects of the medications. Having some shortness of breath, coughing, but no wheezing, or night coughing. Was recommended to repeat current PFTs. 11/2017 CXR showed COPD. Continue to smoke 1 ppd.  Exacerbation: No  Last utilized albuterol: ??    Patient has a history of anxiety and is currently on no medications. Patient states that she is sleeping fairly well and can get out of bed daily to accomplish daily activities. Patient has no anhedonia. Mood is stable. Has no feelings of guilt. Has fair concentration and memory ability. Has energy. Is able to make goals. Is not crying a lot. Appetite is good. Denies any suicidal or homicidal ideations. Does not have any auditory or visual hallucinations. ?? controlled medication seeking behavior. [facility] has no record of controlled medication seeking behavior. ([facility] number: [id number]) . Last UDS was done 8/2017 and was okay.      Patient does have a history " of migraines and we started her on topamax 50 mg orally daily along with imitrex as needed when the headaches start. She has discontinued the topamax because she states that it decreased her urination. It had originally helped. She states that she has migraines about 1-2 times a week. She states that usually the headaches start over the front of her head. Occasionally they start behind the eye. She states that they are throbbing and pounding. She has to lay down in a dark room before they go away. She admits that sometimes the headaches may be secondary to her allergies. Medications make her headaches better. Changes in weather make her headaches worse. Denies any auras. Not tied ot her period.     Patient has a history of a heart murmur since childhood. Patient states that she has been told previously that her heart murmur was benign. She does not have a cardiologist. Echo 8/2017 benign. Does have shortness of breath which she has been told is COPD. She states that after a long day, she does have pedal edema. It does alleviate in the AM. Occasional dizziness. There is a history of CAD in the family.   History of stroke: No  No complaints about any of the medications.    The following portions of the patient's history were reviewed and updated as appropriate: allergies, current medications, past family history, past medical history, past social history, past surgical history and problem list.    Past Medical History  Past Medical History:   Diagnosis Date   • Anxiety    • Arthritis    • Asthma    • Cancer    • COPD (chronic obstructive pulmonary disease)    • Falls    • Heart murmur     As a child   • History of transfusion    • Injury of back    • Migraines    • Mitral valve regurgitation    • Ovarian cyst    • Pneumothorax    • Sciatica of left side    • Sinusitis    • Tobacco abuse        Review of Systems  Constitutional: Denies any recent history of HAs, dizziness, fevers, chills, itching.  Eyes: Denies any  changes in vision. Denies any blurry vision or diplopia.  Ears, Nose, Mouth, Throat: Denies any sore throat.  Cardiovascular: Denies any chest pain, pressure, or palpitations.  Respiratory: Denies any shortness of breath or wheezing.  Gastrointestinal: Denies any abdominal pain, nausea, vomiting, diarrhea, or constipation.  Genitourinary: Denies any changes in urination.  Musculoskeletal: Denies any muscle weakness.  Skin and/or breasts: Denies any rashes.  Neurological: Denies any changes in balance or gait.  Psychiatric: Denies any anxiety, depression, or insomnia. Denies any suicidal or homicidal ideations.    Surgical History  Past Surgical History:   Procedure Laterality Date   • APPENDECTOMY N/A 8/11/2016    Procedure: APPENDECTOMY LAPAROSCOPIC;  Surgeon: Carlos Molina MD;  Location: [facility];  Service:    • CHEST TUBE INSERTION     • DIAGNOSTIC LAPAROSCOPY N/A 8/11/2016    Procedure: DIAGNOSTIC LAPAROSCOPY, LEFT SALPINGOOPHERECTOMY;  Surgeon: Beka Fonseca DO;  Location: [facility];  Service:    • DILATATION AND CURETTAGE     • GYNECOLOGIC CRYOSURGERY      CERVICAL   • KNEE SURGERY Left    • OTHER SURGICAL HISTORY      external benign vaginal cyst removal   • PILONIDAL CYSTECTOMY     • WISDOM TOOTH EXTRACTION         Family History  Family History   Problem Relation Age of Onset   • Hypertension Mother    • COPD Mother    • Arthritis Mother    • Depression Mother    • Heart disease Father    • Hypertension Father    • Arthritis Father    • No Known Problems Daughter    • No Known Problems Son    • Thyroid disease Maternal Aunt    • Heart disease Maternal Uncle    • Nephrolithiasis Maternal Uncle    • Heart disease Maternal Grandmother    • Stroke Maternal Grandmother    • Hypertension Maternal Grandmother    • Diabetes Maternal Grandmother    • Cancer Maternal Grandmother    • COPD Maternal Grandmother    • Nephrolithiasis Maternal Grandmother    • Hypertension Maternal Grandfather    • COPD  Maternal Grandfather    • Nephrolithiasis Maternal Grandfather        Social History  Social History     Social History   • Marital status:      Spouse name: N/A   • Number of children: N/A   • Years of education: N/A     Occupational History   • Not on file.     Social History Main Topics   • Smoking status: Current Every Day Smoker     Packs/day: 1.00     Years: 20.00     Types: Cigarettes   • Smokeless tobacco: Never Used   • Alcohol use No   • Drug use: No      Comment: smoked some pot as a teenager   • Sexual activity: Defer     Other Topics Concern   • Not on file     Social History Narrative       Objective  Physical Exam  Gen: Patient in NAD. Pleasant and answers appropriately. A&Ox3.    Skin: Warm and dry with normal turgor. No purpura, rashes, or unusual pigmentation noted. Hair is normal in appearance and distribution.    HEENT: NC/AT. No lesions noted. Conjunctiva clear, sclera nonicteric. PERRL. EOMI without nystagmus or strabismus. Fundi appear benign. No hemorrhages or exudates of eyes. Auditory canals are patent bilaterally without lesions. TMs intact,  nonerythematous, nonbulging without lesions. Nasal mucosa erythematous, and nonedematous. Frontal and maxillary sinuses are nontender. O/P erythematous and moist without exudate.    Neck: Supple without lymph nodes palpated. FROM.     Lungs: CTA B/L without rales, rhonchi, crackles, or wheezes.    Heart: RRR. S1 and S2 normal. No S3 or S4. No MRGT.    Abd: Soft, nontender,nondistended. (+)BSx4 quadrants.     Extrem: No CCE. Radial pulses 2+/4 and equal B/L. FROMx4. No bone, joint, or muscle tenderness noted.    Neuro: No focal motor/sensory deficits.    Procedures    Assessment/Plan  Ruthie Mederos is a 37 y.o. here for medical followup.  Diagnoses and all orders for this visit:    Chronic seasonal allergic rhinitis due to other allergen  -     Ambulatory Referral to Allergy  Referral to allergy for testing. She declines nasal sprays.  Continue singulair 10 mg orally daily and xyzal 5 mg orally daily.     COPD  Will repeat Pulmonary Function Test once she is better. Discussed tobacco cessation. She is not ready.      History of heart murmur in childhood  Benign. Echo 8/2017 benign.      Chronic migraine without aura without status migrainosus, not intractable  Discontinue topamax 50 mg orally daily for prevention. Continue sumatriptan for abortive purposes. Discussed with patient correct usage of excedrin. Headaches may also be secondary to allergic rhinitis. Continue to monitor.      Preventative Care  WWE with Dr. Fonseca 4/2017 WNL.       Findings and plans discussed with patient who verbalizes understanding and agreement. Will followup with patient once results are in. Patient to followup at clinic PRN or in two months for medical followup.    Francheska Duran MD

## 2018-03-29 ENCOUNTER — APPOINTMENT (OUTPATIENT)
Dept: GENERAL RADIOLOGY | Facility: HOSPITAL | Age: 38
End: 2018-03-29

## 2018-03-29 ENCOUNTER — HOSPITAL ENCOUNTER (EMERGENCY)
Facility: HOSPITAL | Age: 38
Discharge: HOME | End: 2018-03-29
Attending: EMERGENCY MEDICINE | Admitting: EMERGENCY MEDICINE

## 2018-03-29 VITALS
HEIGHT: 63 IN | RESPIRATION RATE: 18 BRPM | SYSTOLIC BLOOD PRESSURE: 97 MMHG | BODY MASS INDEX: 20.38 KG/M2 | DIASTOLIC BLOOD PRESSURE: 75 MMHG | OXYGEN SATURATION: 100 % | TEMPERATURE: 98.6 F | HEART RATE: 76 BPM | WEIGHT: 115 LBS

## 2018-03-29 DIAGNOSIS — R07.81 PLEURITIC CHEST PAIN: Primary | ICD-10-CM

## 2018-03-29 LAB
ALBUMIN SERPL-MCNC: 4.2 G/DL (ref 3.5–5)
ALBUMIN/GLOB SERPL: 1.5 G/DL (ref 1.5–2.5)
ALP SERPL-CCNC: 68 U/L (ref 35–104)
ALT SERPL W P-5'-P-CCNC: 26 U/L (ref 10–36)
ANION GAP SERPL CALCULATED.3IONS-SCNC: 2.9 MMOL/L (ref 3.6–11.2)
AST SERPL-CCNC: 16 U/L (ref 10–30)
B-HCG UR QL: NEGATIVE
BASOPHILS # BLD AUTO: 0.02 10*3/MM3 (ref 0–0.3)
BASOPHILS NFR BLD AUTO: 0.2 % (ref 0–2)
BILIRUB SERPL-MCNC: 0.8 MG/DL (ref 0.2–1.8)
BILIRUB UR QL STRIP: NEGATIVE
BUN BLD-MCNC: 12 MG/DL (ref 7–21)
BUN/CREAT SERPL: 14.5 (ref 7–25)
CALCIUM SPEC-SCNC: 9.4 MG/DL (ref 7.7–10)
CHLORIDE SERPL-SCNC: 109 MMOL/L (ref 99–112)
CLARITY UR: CLEAR
CO2 SERPL-SCNC: 26.1 MMOL/L (ref 24.3–31.9)
COLOR UR: YELLOW
CREAT BLD-MCNC: 0.83 MG/DL (ref 0.43–1.29)
D DIMER PPP FEU-MCNC: <0.27 MCGFEU/ML (ref 0–0.5)
DEPRECATED RDW RBC AUTO: 44.1 FL (ref 37–54)
EOSINOPHIL # BLD AUTO: 0.17 10*3/MM3 (ref 0–0.7)
EOSINOPHIL NFR BLD AUTO: 1.5 % (ref 0–5)
ERYTHROCYTE [DISTWIDTH] IN BLOOD BY AUTOMATED COUNT: 13.1 % (ref 11.5–14.5)
GFR SERPL CREATININE-BSD FRML MDRD: 77 ML/MIN/1.73
GLOBULIN UR ELPH-MCNC: 2.8 GM/DL
GLUCOSE BLD-MCNC: 106 MG/DL (ref 70–110)
GLUCOSE UR STRIP-MCNC: NEGATIVE MG/DL
HCT VFR BLD AUTO: 40.9 % (ref 37–47)
HGB BLD-MCNC: 14 G/DL (ref 12–16)
HGB UR QL STRIP.AUTO: NEGATIVE
IMM GRANULOCYTES # BLD: 0.03 10*3/MM3 (ref 0–0.03)
IMM GRANULOCYTES NFR BLD: 0.3 % (ref 0–0.5)
KETONES UR QL STRIP: NEGATIVE
LEUKOCYTE ESTERASE UR QL STRIP.AUTO: NEGATIVE
LYMPHOCYTES # BLD AUTO: 1.67 10*3/MM3 (ref 1–3)
LYMPHOCYTES NFR BLD AUTO: 14.2 % (ref 21–51)
MCH RBC QN AUTO: 31.8 PG (ref 27–33)
MCHC RBC AUTO-ENTMCNC: 34.2 G/DL (ref 33–37)
MCV RBC AUTO: 93 FL (ref 80–94)
MONOCYTES # BLD AUTO: 0.46 10*3/MM3 (ref 0.1–0.9)
MONOCYTES NFR BLD AUTO: 3.9 % (ref 0–10)
NEUTROPHILS # BLD AUTO: 9.37 10*3/MM3 (ref 1.4–6.5)
NEUTROPHILS NFR BLD AUTO: 79.9 % (ref 30–70)
NITRITE UR QL STRIP: NEGATIVE
OSMOLALITY SERPL CALC.SUM OF ELEC: 275.9 MOSM/KG (ref 273–305)
PH UR STRIP.AUTO: 8 [PH] (ref 5–8)
PLATELET # BLD AUTO: 200 10*3/MM3 (ref 130–400)
PMV BLD AUTO: 11.6 FL (ref 6–10)
POTASSIUM BLD-SCNC: 4 MMOL/L (ref 3.5–5.3)
PROT SERPL-MCNC: 7 G/DL (ref 6–8)
PROT UR QL STRIP: NEGATIVE
RBC # BLD AUTO: 4.4 10*6/MM3 (ref 4.2–5.4)
SODIUM BLD-SCNC: 138 MMOL/L (ref 135–153)
SP GR UR STRIP: 1.01 (ref 1–1.03)
TROPONIN I SERPL-MCNC: <0.006 NG/ML
UROBILINOGEN UR QL STRIP: NORMAL
WBC NRBC COR # BLD: 11.72 10*3/MM3 (ref 4.5–12.5)

## 2018-03-29 PROCEDURE — 36415 COLL VENOUS BLD VENIPUNCTURE: CPT

## 2018-03-29 PROCEDURE — 71046 X-RAY EXAM CHEST 2 VIEWS: CPT

## 2018-03-29 PROCEDURE — 81025 URINE PREGNANCY TEST: CPT | Performed by: PHYSICIAN ASSISTANT

## 2018-03-29 PROCEDURE — 85025 COMPLETE CBC W/AUTO DIFF WBC: CPT | Performed by: PHYSICIAN ASSISTANT

## 2018-03-29 PROCEDURE — 84484 ASSAY OF TROPONIN QUANT: CPT | Performed by: PHYSICIAN ASSISTANT

## 2018-03-29 PROCEDURE — 81003 URINALYSIS AUTO W/O SCOPE: CPT | Performed by: PHYSICIAN ASSISTANT

## 2018-03-29 PROCEDURE — 80053 COMPREHEN METABOLIC PANEL: CPT | Performed by: PHYSICIAN ASSISTANT

## 2018-03-29 PROCEDURE — 93005 ELECTROCARDIOGRAM TRACING: CPT | Performed by: PHYSICIAN ASSISTANT

## 2018-03-29 PROCEDURE — 85379 FIBRIN DEGRADATION QUANT: CPT | Performed by: PHYSICIAN ASSISTANT

## 2018-03-29 PROCEDURE — 71046 X-RAY EXAM CHEST 2 VIEWS: CPT | Performed by: RADIOLOGY

## 2018-03-29 PROCEDURE — 99283 EMERGENCY DEPT VISIT LOW MDM: CPT

## 2018-05-14 ENCOUNTER — OFFICE VISIT (OUTPATIENT)
Dept: FAMILY MEDICINE CLINIC | Facility: CLINIC | Age: 38
End: 2018-05-14

## 2018-05-14 VITALS
HEIGHT: 63 IN | HEART RATE: 90 BPM | DIASTOLIC BLOOD PRESSURE: 77 MMHG | OXYGEN SATURATION: 99 % | WEIGHT: 111 LBS | BODY MASS INDEX: 19.67 KG/M2 | SYSTOLIC BLOOD PRESSURE: 112 MMHG

## 2018-05-14 DIAGNOSIS — J30.2 CHRONIC SEASONAL ALLERGIC RHINITIS DUE TO OTHER ALLERGEN: ICD-10-CM

## 2018-05-14 DIAGNOSIS — G43.709 CHRONIC MIGRAINE WITHOUT AURA WITHOUT STATUS MIGRAINOSUS, NOT INTRACTABLE: ICD-10-CM

## 2018-05-14 DIAGNOSIS — Z72.0 TOBACCO ABUSE: Primary | ICD-10-CM

## 2018-05-14 LAB
PEAK FLOW: 225
PEAK FLOW: 225
PEAK FLOW: 250

## 2018-05-14 PROCEDURE — 99214 OFFICE O/P EST MOD 30 MIN: CPT | Performed by: FAMILY MEDICINE

## 2018-05-14 RX ORDER — MONTELUKAST SODIUM 10 MG/1
10 TABLET ORAL NIGHTLY
Qty: 30 TABLET | Refills: 5 | Status: SHIPPED | OUTPATIENT
Start: 2018-05-14

## 2018-05-14 RX ORDER — LEVOCETIRIZINE DIHYDROCHLORIDE 5 MG/1
5 TABLET, FILM COATED ORAL EVERY EVENING
Qty: 30 TABLET | Refills: 5 | Status: SHIPPED | OUTPATIENT
Start: 2018-05-14 | End: 2020-11-16

## 2018-05-14 RX ORDER — PROPRANOLOL HYDROCHLORIDE 20 MG/1
20 TABLET ORAL 3 TIMES DAILY
Qty: 90 TABLET | Refills: 5 | Status: SHIPPED | OUTPATIENT
Start: 2018-05-14 | End: 2020-11-16

## 2018-05-14 RX ORDER — AZELASTINE HYDROCHLORIDE, FLUTICASONE PROPIONATE 137; 50 UG/1; UG/1
1 SPRAY, METERED NASAL 2 TIMES DAILY
Qty: 23 G | Refills: 5 | Status: SHIPPED | OUTPATIENT
Start: 2018-05-14 | End: 2020-11-16

## 2018-05-14 NOTE — PATIENT INSTRUCTIONS
Try the propranolol 20 mg three times a day for headaches.    Get the dymista, singulair, and the xyzal for the allergies.  We will call you after we get the note from allergy.

## 2018-05-29 NOTE — PROGRESS NOTES
"Ruthie Mederos     VITALS: Blood pressure 112/77, pulse 90, height 160 cm (63\"), weight 50.3 kg (111 lb), SpO2 99 %, not currently breastfeeding.    Subjective  Chief Complaint:   Chief Complaint   Patient presents with   • Follow-up   • Allergies        History of Present Illness:  Patient is a 37 y.o. female with a medical history significant for migraines and allergic rhintiis who presents to clinic secondary to medical followup. Recent echo 8/2017 benign. She is not feeling well today. She continues to have allergy symptoms including congestion and itchy eyes. Has tried different nasal sprays without success. Symptoms worse in August - Feb.    Patient has a history of allergic rhinitis and is currently on singulair 10 mg orally daily and xyzal 5 mg orally daily. Denies any side effects of the medications. Positive sinus congestion, sinus headaches, watery eyes, itchy eyes, rhinorrhea, coughing, or postnasal discharge.   Allergy injections:  No    Patient has a history of COPD and is currently on no medications. Denies any side effects of the medications. Having some shortness of breath, coughing, but no wheezing, or night coughing. Was recommended to repeat current PFTs. 11/2017 CXR showed COPD. Continue to smoke 1 ppd.  Exacerbation: No  Last utilized albuterol: ??    Patient has a history of anxiety and is currently on no medications. Patient states that she is sleeping fairly well and can get out of bed daily to accomplish daily activities. Patient has no anhedonia. Mood is stable. Has no feelings of guilt. Has fair concentration and memory ability. Has energy. Is able to make goals. Is not crying a lot. Appetite is good. Denies any suicidal or homicidal ideations. Does not have any auditory or visual hallucinations. ?? controlled medication seeking behavior. [facility] has no record of controlled medication seeking behavior. ([facility] number: [id number]) . Last UDS was done 8/2017 and was okay.      Patient does have " a history of migraines. Failed topamax 50 mg orally daily. Taking imitrex as needed when the headaches start. She has discontinued the topamax because she states that it decreased her urination. It had originally helped. She states that she has migraines about 1-2 times a week. She states that usually the headaches start over the front of her head. Occasionally they start behind the eye. She states that they are throbbing and pounding. She has to lay down in a dark room before they go away. She admits that sometimes the headaches may be secondary to her allergies. Medications make her headaches better. Changes in weather make her headaches worse. Denies any auras. Not tied ot her period.     Patient has a history of a heart murmur since childhood. Patient states that she has been told previously that her heart murmur was benign. She does not have a cardiologist. Echo 8/2017 benign. Does have shortness of breath which she has been told is COPD. She states that after a long day, she does have pedal edema. It does alleviate in the AM. Occasional dizziness. There is a history of CAD in the family.   History of stroke: No  No complaints about any of the medications.    The following portions of the patient's history were reviewed and updated as appropriate: allergies, current medications, past family history, past medical history, past social history, past surgical history and problem list.    Past Medical History  Past Medical History:   Diagnosis Date   • Anxiety    • Arthritis    • Asthma    • Cancer    • COPD (chronic obstructive pulmonary disease)    • Falls    • Heart murmur     As a child   • History of transfusion    • Injury of back    • Migraines    • Mitral valve regurgitation    • Ovarian cyst    • Pneumothorax    • Sciatica of left side    • Sinusitis    • Tobacco abuse        Review of Systems  Constitutional: Denies any recent history of HAs, dizziness, fevers, chills, itching.  Eyes: Denies any changes in  vision. Denies any blurry vision or diplopia.  Ears, Nose, Mouth, Throat: Denies any sore throat, rhinorrhea, or cough.  Cardiovascular: Denies any chest pain, pressure, or palpitations.  Respiratory: Denies any shortness of breath or wheezing.  Gastrointestinal: Denies any abdominal pain, nausea, vomiting, diarrhea, or constipation.  Genitourinary: Denies any changes in urination.  Musculoskeletal: Denies any muscle weakness.  Skin and/or breasts: Denies any rashes.  Neurological: Denies any changes in balance or gait.  Psychiatric: Denies any anxiety, depression, or insomnia. Denies any suicidal or homicidal ideations.  Endocrine: Denies any heat or cold intolerance. Denies any voice changes, polydipsia, or polyuria.  Hematologic/Lymphatic: Denies any anemia or easy bruising.    Surgical History  Past Surgical History:   Procedure Laterality Date   • APPENDECTOMY N/A 8/11/2016    Procedure: APPENDECTOMY LAPAROSCOPIC;  Surgeon: Carlos Molina MD;  Location: [facility];  Service:    • CHEST TUBE INSERTION     • DIAGNOSTIC LAPAROSCOPY N/A 8/11/2016    Procedure: DIAGNOSTIC LAPAROSCOPY, LEFT SALPINGOOPHERECTOMY;  Surgeon: Beka Fonseca DO;  Location: [facility];  Service:    • DILATATION AND CURETTAGE     • GYNECOLOGIC CRYOSURGERY      CERVICAL   • KNEE SURGERY Left    • OTHER SURGICAL HISTORY      external benign vaginal cyst removal   • PILONIDAL CYSTECTOMY     • WISDOM TOOTH EXTRACTION         Family History  Family History   Problem Relation Age of Onset   • Hypertension Mother    • COPD Mother    • Arthritis Mother    • Depression Mother    • Heart disease Father    • Hypertension Father    • Arthritis Father    • No Known Problems Daughter    • No Known Problems Son    • Thyroid disease Maternal Aunt    • Heart disease Maternal Uncle    • Nephrolithiasis Maternal Uncle    • Heart disease Maternal Grandmother    • Stroke Maternal Grandmother    • Hypertension Maternal Grandmother    • Diabetes Maternal  Grandmother    • Cancer Maternal Grandmother    • COPD Maternal Grandmother    • Nephrolithiasis Maternal Grandmother    • Hypertension Maternal Grandfather    • COPD Maternal Grandfather    • Nephrolithiasis Maternal Grandfather        Social History  Social History     Social History   • Marital status:      Spouse name: N/A   • Number of children: N/A   • Years of education: N/A     Occupational History   • Not on file.     Social History Main Topics   • Smoking status: Current Every Day Smoker     Packs/day: 1.00     Years: 20.00     Types: Cigarettes   • Smokeless tobacco: Never Used   • Alcohol use No   • Drug use: No      Comment: smoked some pot as a teenager   • Sexual activity: Defer     Other Topics Concern   • Not on file     Social History Narrative   • No narrative on file       Objective  Physical Exam  Gen: Patient in NAD. Pleasant and answers appropriately. A&Ox3.    Skin: Warm and dry with normal turgor. No purpura, rashes, or unusual pigmentation noted. Hair is normal in appearance and distribution.    HEENT: NC/AT. No lesions noted. Conjunctiva clear, sclera nonicteric. PERRL. EOMI without nystagmus or strabismus. Fundi appear benign. No hemorrhages or exudates of eyes. Auditory canals are patent bilaterally without lesions. TMs intact,  nonerythematous, nonbulging without lesions. Nasal mucosa pink, nonerythematous, and nonedematous. Frontal and maxillary sinuses are nontender. O/P nonerythematous and moist without exudate.    Neck: Supple without lymph nodes palpated. FROM.     Lungs: CTA B/L without rales, rhonchi, crackles, or wheezes.    Heart: RRR. S1 and S2 normal. No S3 or S4. No MRGT.    Abd: Soft, nontender,nondistended. (+)BSx4 quadrants.     Extrem: No CCE. Radial pulses 2+/4 and equal B/L. FROMx4. No bone, joint, or muscle tenderness noted.    Neuro: No focal motor/sensory deficits.    Procedures    Assessment/Plan  Ruthie Gatito is a 37 y.o. here for medical  followup.  Diagnoses and all orders for this visit:    Tobacco abuse  -     Peak Flow    Chronic seasonal allergic rhinitis due to other allergen  -     Azelastine-Fluticasone 137-50 MCG/ACT suspension; 1 spray into each nostril 2 (Two) Times a Day.  -     montelukast (SINGULAIR) 10 MG tablet; Take 1 tablet by mouth Every Night.  -     levocetirizine (XYZAL) 5 MG tablet; Take 1 tablet by mouth Every Evening.    Chronic migraine without aura without status migrainosus, not intractable  -     propranolol (INDERAL) 20 MG tablet; Take 1 tablet by mouth 3 (Three) Times a Day.    History of heart murmur in childhood  Benign. Echo 8/2017 benign.        Preventative Care  WWE with Dr. Fonseca 4/2017 WNL.     Findings and plans discussed with patient who verbalizes understanding and agreement. Will followup with patient once results are in. Patient to followup at clinic PRN or in one month for further medical followup.    Francheska Duran MD

## 2020-06-26 ENCOUNTER — HOSPITAL ENCOUNTER (EMERGENCY)
Facility: HOSPITAL | Age: 40
Discharge: HOME | End: 2020-06-26
Attending: EMERGENCY MEDICINE | Admitting: EMERGENCY MEDICINE

## 2020-06-26 VITALS
BODY MASS INDEX: 19.49 KG/M2 | HEART RATE: 102 BPM | TEMPERATURE: 98.9 F | SYSTOLIC BLOOD PRESSURE: 123 MMHG | RESPIRATION RATE: 20 BRPM | HEIGHT: 63 IN | OXYGEN SATURATION: 97 % | DIASTOLIC BLOOD PRESSURE: 80 MMHG | WEIGHT: 110 LBS

## 2020-06-26 DIAGNOSIS — K02.9 PAIN DUE TO DENTAL CARIES: Primary | ICD-10-CM

## 2020-06-26 PROCEDURE — 99283 EMERGENCY DEPT VISIT LOW MDM: CPT

## 2020-06-26 RX ORDER — IBUPROFEN 800 MG/1
800 TABLET ORAL EVERY 6 HOURS PRN
Qty: 30 TABLET | Refills: 0 | Status: SHIPPED | OUTPATIENT
Start: 2020-06-26

## 2020-06-26 RX ORDER — HYDROCODONE BITARTRATE AND ACETAMINOPHEN 5; 325 MG/1; MG/1
1 TABLET ORAL EVERY 6 HOURS PRN
Qty: 12 TABLET | Refills: 0 | Status: SHIPPED | OUTPATIENT
Start: 2020-06-26 | End: 2020-11-16

## 2020-06-26 RX ORDER — CLINDAMYCIN HYDROCHLORIDE 300 MG/1
300 CAPSULE ORAL 4 TIMES DAILY
Qty: 40 CAPSULE | Refills: 0 | Status: SHIPPED | OUTPATIENT
Start: 2020-06-26 | End: 2020-07-06

## 2020-06-26 RX ADMIN — BENZOCAINE 1 EACH: 200 LIQUID DENTAL; ORAL; PERIODONTAL at 17:41

## 2020-06-26 NOTE — ED PROVIDER NOTES
Subjective     History provided by:  Patient   used: No    Dental Pain   Location:  Upper  Quality:  Throbbing  Severity:  Moderate  Onset quality:  Sudden  Duration:  2 weeks  Timing:  Constant  Progression:  Worsening  Chronicity:  New  Context: dental caries    Context comment:  Pt states that she has had dental pain for a week that shoots into her ear. patient states she thought it was an ear infection and went to urgent care 1 week ago. Pt states that they started her on doxycycline and it hasn't helped.   Relieved by:  Nothing  Ineffective treatments: doxycycline. patient states her teeth are hurting worse now and she chipped a tooth.   Associated symptoms: facial pain    Associated symptoms: no congestion, no difficulty swallowing, no drooling, no facial swelling, no fever, no gum swelling, no headaches, no neck pain, no neck swelling, no oral bleeding, no oral lesions and no trismus    Risk factors: lack of dental care and smoking        Review of Systems   Constitutional: Negative.  Negative for fever.   HENT: Positive for dental problem. Negative for congestion, drooling, facial swelling and mouth sores.    Eyes: Negative.    Respiratory: Negative.    Cardiovascular: Negative.    Gastrointestinal: Negative.    Endocrine: Negative.    Genitourinary: Negative.    Musculoskeletal: Negative.  Negative for neck pain.   Skin: Negative.    Allergic/Immunologic: Negative.    Neurological: Negative.  Negative for headaches.   Hematological: Negative.    Psychiatric/Behavioral: Negative.    All other systems reviewed and are negative.      Past Medical History:   Diagnosis Date   • Anxiety    • Arthritis    • Asthma    • Cancer (CMS/[facility])    • COPD (chronic obstructive pulmonary disease) (CMS/[facility])    • Falls    • Heart murmur     As a child   • History of transfusion    • Injury of back    • Migraines    • Mitral valve regurgitation    • Ovarian cyst    • Pneumothorax    • Sciatica of left side     • Sinusitis    • Tobacco abuse        Allergies   Allergen Reactions   • Topamax [Topiramate] Urinary Retention   • Amoxil [Amoxicillin] Rash   • Cephalosporins Rash   • Penicillins Rash       Past Surgical History:   Procedure Laterality Date   • APPENDECTOMY N/A 8/11/2016    Procedure: APPENDECTOMY LAPAROSCOPIC;  Surgeon: Carlos Molina MD;  Location: [facility];  Service:    • CHEST TUBE INSERTION     • DIAGNOSTIC LAPAROSCOPY N/A 8/11/2016    Procedure: DIAGNOSTIC LAPAROSCOPY, LEFT SALPINGOOPHERECTOMY;  Surgeon: Beka Fonseca DO;  Location: [facility];  Service:    • DILATATION AND CURETTAGE     • GYNECOLOGIC CRYOSURGERY      CERVICAL   • KNEE SURGERY Left    • OTHER SURGICAL HISTORY      external benign vaginal cyst removal   • PILONIDAL CYSTECTOMY     • WISDOM TOOTH EXTRACTION         Family History   Problem Relation Age of Onset   • Hypertension Mother    • COPD Mother    • Arthritis Mother    • Depression Mother    • Heart disease Father    • Hypertension Father    • Arthritis Father    • No Known Problems Daughter    • No Known Problems Son    • Thyroid disease Maternal Aunt    • Heart disease Maternal Uncle    • Nephrolithiasis Maternal Uncle    • Heart disease Maternal Grandmother    • Stroke Maternal Grandmother    • Hypertension Maternal Grandmother    • Diabetes Maternal Grandmother    • Cancer Maternal Grandmother    • COPD Maternal Grandmother    • Nephrolithiasis Maternal Grandmother    • Hypertension Maternal Grandfather    • COPD Maternal Grandfather    • Nephrolithiasis Maternal Grandfather        Social History     Socioeconomic History   • Marital status:      Spouse name: Not on file   • Number of children: Not on file   • Years of education: Not on file   • Highest education level: Not on file   Tobacco Use   • Smoking status: Current Every Day Smoker     Packs/day: 1.00     Years: 20.00     Pack years: 20.00     Types: Cigarettes   • Smokeless tobacco: Never Used   Substance  and Sexual Activity   • Alcohol use: No   • Drug use: No     Comment: smoked some pot as a teenager   • Sexual activity: Defer           Objective   Physical Exam   Constitutional: She is oriented to person, place, and time. She appears well-developed and well-nourished.   HENT:   Head: Normocephalic and atraumatic.   Right Ear: Hearing, tympanic membrane, external ear and ear canal normal. No lacerations. No drainage, swelling or tenderness. No foreign bodies. No mastoid tenderness. Tympanic membrane is not injected, not scarred, not perforated, not erythematous, not retracted and not bulging. Tympanic membrane mobility is normal. No middle ear effusion. No hemotympanum. No decreased hearing is noted.   Left Ear: Hearing, tympanic membrane, external ear and ear canal normal. No lacerations. No drainage, swelling or tenderness. No foreign bodies. No mastoid tenderness. Tympanic membrane is not injected, not scarred, not perforated, not erythematous, not retracted and not bulging. Tympanic membrane mobility is normal.  No middle ear effusion. No hemotympanum. No decreased hearing is noted.   Nose: Nose normal.   Mouth/Throat: Oropharynx is clear and moist. Dental caries present.   Multiple dental caries, chipped front tooth, no signs of dental abscess. No gum swelling. No facial swelling.  No signs of deep tissue infection. No streaking.     Eyes: Pupils are equal, round, and reactive to light. Conjunctivae and EOM are normal.   Neck: Normal range of motion. Neck supple.   Cardiovascular: Normal rate, regular rhythm, normal heart sounds and intact distal pulses.   Pulmonary/Chest: Effort normal and breath sounds normal.   Abdominal: Soft. Bowel sounds are normal.   Musculoskeletal: Normal range of motion.   Neurological: She is alert and oriented to person, place, and time.   Skin: Skin is warm and dry. Capillary refill takes less than 2 seconds.   Nursing note and vitals reviewed.      Procedures           ED  Course  ED Course as of Jun 26 1714 Fri Jun 26, 2020   1700 ASTRID [id number]    [ML]   1712 Pt states that she was able to make appointment to see dentist on Monday but needs something to help with pain until then. Discussed sx that would warrant return to the ED.     [ML]      ED Course User Index  [ML] Deyanira Butler PA                                           MDM  Number of Diagnoses or Management Options  Pain due to dental caries:   Risk of Complications, Morbidity, and/or Mortality  Presenting problems: minimal  Diagnostic procedures: minimal  Management options: minimal    Patient Progress  Patient progress: improved      Final diagnoses:   Pain due to dental caries            Deyanira Butler PA  06/26/20 1714

## 2020-11-12 ENCOUNTER — TRANSCRIBE ORDERS (OUTPATIENT)
Dept: ADMINISTRATIVE | Facility: HOSPITAL | Age: 40
End: 2020-11-12

## 2020-11-12 DIAGNOSIS — Z01.818 PRE-OPERATIVE CLEARANCE: Primary | ICD-10-CM

## 2020-11-15 ENCOUNTER — PREP FOR SURGERY (OUTPATIENT)
Dept: OTHER | Facility: HOSPITAL | Age: 40
End: 2020-11-15

## 2020-11-15 DIAGNOSIS — N83.202 LEFT OVARIAN CYST: ICD-10-CM

## 2020-11-15 DIAGNOSIS — N92.1 METRORRHAGIA: Primary | ICD-10-CM

## 2020-11-15 DIAGNOSIS — Z30.2 ENCOUNTER FOR FEMALE STERILIZATION PROCEDURE: ICD-10-CM

## 2020-11-15 RX ORDER — SODIUM CHLORIDE 0.9 % (FLUSH) 0.9 %
10 SYRINGE (ML) INJECTION AS NEEDED
Status: CANCELLED | OUTPATIENT
Start: 2020-11-15

## 2020-11-15 RX ORDER — SODIUM CHLORIDE 0.9 % (FLUSH) 0.9 %
3 SYRINGE (ML) INJECTION EVERY 12 HOURS SCHEDULED
Status: CANCELLED | OUTPATIENT
Start: 2020-11-15

## 2020-11-15 RX ORDER — CLINDAMYCIN PHOSPHATE 900 MG/50ML
900 INJECTION INTRAVENOUS ONCE
Status: CANCELLED | OUTPATIENT
Start: 2020-11-15 | End: 2020-11-15

## 2020-11-16 ENCOUNTER — LAB (OUTPATIENT)
Dept: LAB | Facility: HOSPITAL | Age: 40
End: 2020-11-16

## 2020-11-16 ENCOUNTER — APPOINTMENT (OUTPATIENT)
Dept: PREADMISSION TESTING | Facility: HOSPITAL | Age: 40
End: 2020-11-16

## 2020-11-16 DIAGNOSIS — Z01.818 PRE-OPERATIVE CLEARANCE: ICD-10-CM

## 2020-11-16 DIAGNOSIS — Z30.2 ENCOUNTER FOR FEMALE STERILIZATION PROCEDURE: ICD-10-CM

## 2020-11-16 DIAGNOSIS — N83.202 LEFT OVARIAN CYST: ICD-10-CM

## 2020-11-16 DIAGNOSIS — N92.1 METRORRHAGIA: ICD-10-CM

## 2020-11-16 LAB
ABO GROUP BLD: NORMAL
BASOPHILS # BLD AUTO: 0.06 10*3/MM3 (ref 0–0.2)
BASOPHILS NFR BLD AUTO: 0.6 % (ref 0–1.5)
BLD GP AB SCN SERPL QL: NEGATIVE
DEPRECATED RDW RBC AUTO: 47.4 FL (ref 37–54)
EOSINOPHIL # BLD AUTO: 0.32 10*3/MM3 (ref 0–0.4)
EOSINOPHIL NFR BLD AUTO: 3.3 % (ref 0.3–6.2)
ERYTHROCYTE [DISTWIDTH] IN BLOOD BY AUTOMATED COUNT: 13.6 % (ref 12.3–15.4)
HCG SERPL QL: NEGATIVE
HCT VFR BLD AUTO: 45.4 % (ref 34–46.6)
HGB BLD-MCNC: 15 G/DL (ref 12–15.9)
IMM GRANULOCYTES # BLD AUTO: 0.03 10*3/MM3 (ref 0–0.05)
IMM GRANULOCYTES NFR BLD AUTO: 0.3 % (ref 0–0.5)
LYMPHOCYTES # BLD AUTO: 2.01 10*3/MM3 (ref 0.7–3.1)
LYMPHOCYTES NFR BLD AUTO: 20.4 % (ref 19.6–45.3)
MCH RBC QN AUTO: 31.1 PG (ref 26.6–33)
MCHC RBC AUTO-ENTMCNC: 33 G/DL (ref 31.5–35.7)
MCV RBC AUTO: 94 FL (ref 79–97)
MONOCYTES # BLD AUTO: 0.47 10*3/MM3 (ref 0.1–0.9)
MONOCYTES NFR BLD AUTO: 4.8 % (ref 5–12)
NEUTROPHILS NFR BLD AUTO: 6.95 10*3/MM3 (ref 1.7–7)
NEUTROPHILS NFR BLD AUTO: 70.6 % (ref 42.7–76)
NRBC BLD AUTO-RTO: 0 /100 WBC (ref 0–0.2)
PLATELET # BLD AUTO: 219 10*3/MM3 (ref 140–450)
PMV BLD AUTO: 11.3 FL (ref 6–12)
QT INTERVAL: 398 MS
QTC INTERVAL: 417 MS
RBC # BLD AUTO: 4.83 10*6/MM3 (ref 3.77–5.28)
RH BLD: NEGATIVE
T&S EXPIRATION DATE: NORMAL
WBC # BLD AUTO: 9.84 10*3/MM3 (ref 3.4–10.8)

## 2020-11-16 PROCEDURE — 86900 BLOOD TYPING SEROLOGIC ABO: CPT

## 2020-11-16 PROCEDURE — 86850 RBC ANTIBODY SCREEN: CPT

## 2020-11-16 PROCEDURE — 86901 BLOOD TYPING SEROLOGIC RH(D): CPT

## 2020-11-16 PROCEDURE — C9803 HOPD COVID-19 SPEC COLLECT: HCPCS

## 2020-11-16 PROCEDURE — 93010 ELECTROCARDIOGRAM REPORT: CPT | Performed by: INTERNAL MEDICINE

## 2020-11-16 PROCEDURE — 85025 COMPLETE CBC W/AUTO DIFF WBC: CPT

## 2020-11-16 PROCEDURE — U0004 COV-19 TEST NON-CDC HGH THRU: HCPCS | Performed by: OBSTETRICS & GYNECOLOGY

## 2020-11-16 PROCEDURE — 93005 ELECTROCARDIOGRAM TRACING: CPT

## 2020-11-16 PROCEDURE — 84703 CHORIONIC GONADOTROPIN ASSAY: CPT

## 2020-11-16 PROCEDURE — 36415 COLL VENOUS BLD VENIPUNCTURE: CPT

## 2020-11-16 NOTE — DISCHARGE INSTRUCTIONS
1100   11/18/2020    ARRIVAL TIME    TAKE the following medications the morning of surgery:  All heart or blood pressure medications    HOLD all diabetic medications the morning of surgery as ordered by physician.    Please discontinue all blood thinners and anticoagulants (except aspirin) prior to surgery as per your surgeon and cardiologist instructions.  Aspirin may be continued up to the day prior to surgery.     CHLORHEXIDINE CLOTHS GIVEN WITH INSTRUCTIONS AND FORM TO RETURN TO HOSPITAL, IF APPLICABLE.    General Instructions:  · Do not eat or drink after midnight: includes water, mints, or gum. You may brush your teeth.  Dental appliances that are removable must be taken out day of surgery.  · Do not smoke, chew tobacco, or drink alcohol.  · Bring medications in original bottles, any inhalers and if applicable your C-PAP/BI-PAP machine.  · Bring any papers given to you in the doctor's office.  · Wear clean comfortable clothes and socks.  · Do not wear contact lenses or make-up. Bring a case for your glasses if applicable.  · Bring crutches or walker if applicable.  · Leave all other valuables and jewelry at home.    If you were given a blood bank ID arm band remember to bring it with you the day of surgery.    Preventing a Surgical Site Infection:  Shower the night before surgery (unless instructed other wise) using a fresh bar of anti-bacterial soap (such as Dial) and clean washcloth. Dry with a clean towel and dress in clean clothing.  For 2 to 3 days before surgery, avoid shaving with a razor near where you will have surgery because the razor can irritate skin and make it easier to develop an infection. Ask your surgeon if you will be receiving antibiotics prior to surgery.  Make sure you, your family, and all healthcare providers clear their hands with soap and water or an alcohol-based hand  before caring for you or your wound.  If at all possible, quit smoking as many days before surgery as you  can.    Day of surgery:  Upon arrival, a Pre-op nurse and Anesthesiologist will review your health history, obtain vital signs, and answer questions you may have. The only belongings needed at this time will be your home medications and if applicable your C-PAP/BI-PAP machine. If you are staying overnight your family can leave the rest of your belongings in the car and bring them to your room later. A Pre-op nurse will start an IV and you may receive medication in preparation for surgery, including something to help you relax. Your family will be able to see you in the Pre-op area. While you are in surgery your family should notify the waiting room  if they leave the waiting room area and provide a contact phone number.    Please be aware that surgery does come with discomfort. We want to make every effort to control your discomfort so please discuss any uncontrolled symptoms with your nurse. Your doctor will most likely have prescribed pain medications.    If you are going home after surgery you will receive individualized written care instructions before being discharged. A responsible adult must drive you to and from the hospital on the day of surgery and stay with you for 24 hours.    If you are staying overnight following surgery, you will be transported to your hospital room following the recovery period.  [facility] has all private rooms.    If you have any questions please call Pre-Admission Testing at [phone].  Deductibles and co-payments are collected on the day of service. Please be prepared to pay the required co-pay, deductible or deposit on the day of service as defined by your plan.    A RESPONSIBLE PERSON MUST REMAIN IN THE WAITING ROOM DURING YOUR PROCEDURE AND A RESPONSIBLE  MUST BE AVAILABLE UPON YOUR DISCHARGE.

## 2020-11-17 LAB — SARS-COV-2 RNA RESP QL NAA+PROBE: NOT DETECTED

## 2020-11-17 NOTE — H&P
This 40 year old female presents for Pre op.      History of Present Illness:  1.  Pre op   US shows the cyst to be the same as in December.   Periods are irregular and heavy.  Wants permanent sterilization.  Is currently taking micronor.  Didn't do well with IUD in the past.  Would like something definitive.     Also has something growing on the outside.  Very hard.    Discussed ablation, RIGHT salpingectomy, RIGHT ovarian cystectomy.  R/B reviewed.  Desires to proceed.    U/S FINDINGS:    Uterus: The uterus is anteverted.   Endometrium: Endometrium Thickness 0.40 cm.   Adnexa: The left ovary has been removed.      CONCLUSIONS:     1. Myometrial texture appears heterogeneous.  Appears to still be a complex area coming off of the right ovary, ? etiology.                Screening Tools  Other Screenings:  Encounter Date Performed Date Instrument Score Severity/Interpretation MDD Classification   11/16/2020 11/16/2020 Patient Health Questionnaire (PHQ-2) 0 Further testing is not required        Gynecologic History:  Date of last Pap: 05/29/2018.  Past Systemic History    Medical History (Reviewed,updated)  Disease Onset Date Comments   LT KNEE SURGERY     LUNG DISEASE     OSTEOPOROSIS      PRE CANCER CELLS ON CERVIX     ANXIETY     HEART DISEASE         Surgical History/Management (Reviewed,updated)  Management Date Comments   PACENTA REMOVAL 2014    MOLE REMOVAL 2010    CRYOSURGERY 1999    CRYOSURGERY 1996    WISDOM TEETH 1996    CYST REMOVED RISHI TAIL BONE     LEFT OVARY AND TUMOR     LT LUNG TOP LOBE PNEUMOTHORAX     Diagnostics History:  Status Study Ordered Completed Interpretation  Result / Report   completed TRANSVAGINAL US, NON-OB 05/09/2017 05/19/2017      completed TRANSVAGINAL US, NON-OB 10/24/2019 10/24/2019 See module     ordered TRANSVAGINAL US, NON-OB 12/19/2019       ordered TRANSVAGINAL US, NON-OB 10/25/2019       result received TRANSVAGINAL US, NON-OB 12/19/2019 12/19/2019 See module     result  received TRANSVAGINAL US, NON-OB 2020 See module     completed US Exam Breast Complete Left 2017   DOS: 17       Pap Result, HPV Detail and Diagnostic/Treatment Performed  Date Test Procedure Pap Result HPV Detail Comments   2018 LAB      05/15/2017  See module       PROBLEM LIST:   Problem List reviewed.   No active problems        Medications (active prior to today)  Medication Name Sig Description Start Date Stop Date Refilled Rx Elsewhere   Ortho Micronor 0.35 mg tablet take 1 tablet by oral route  every day 2020 N     Patient Status   Completed with information received for patient transitioning into care.   Completed with information received for patient in a summary of care record.     Medication Reconciliation  Medications reconciled today.  Medication Reviewed  Adherence Medication Name Sig Desc Elsewhere Status   taking as directed Ortho Micronor 0.35 mg tablet take 1 tablet by oral route  every day N Verified     Allergies:  Ingredient Reaction (Severity) Medication Name Comment   AMOXICILLIN TRIHYDRATE Hives/Skin Rash AUGMENTIN    CEPHALOSPORINS Hives/Skin Rash     PENICILLINS Hives/Skin Rash     POTASSIUM CLAVULANATE Hives/Skin Rash AUGMENTIN    TOPIRAMATE  Topamax        Family History  (Reviewed, updated)  Relationship Family Member Name  Age at Death Condition Onset Age Cause of Death   Father    Cardiovascular disease  N   Father    Hypertension  N   Maternal aunt    Thyroid disease  N   Maternal grandfather    Hypertension  N   Maternal grandmother    Cardiovascular disease  N   Maternal grandmother    Hypertension  N   Maternal grandmother    Stroke  N   Maternal grandmother    Diabetes mellitus  N   Mother    Hypertension  N   Paternal grandfather    Hypertension  N   Paternal grandmother    Cancer, breast  N   Paternal grandmother    Hypertension  N   Paternal grandmother    Cardiovascular disease  N   M    Social History:   (Reviewed, updated)  Tobacco use reviewed.  Preferred language is English.    MARITAL STATUS/FAMILY/SOCIAL SUPPORT  Currently .    Smoking status: Heavy tobacco smoker.    SMOKING STATUS  Type Smoking Status Usage Per Day Years Used Pack Years Total Pack Years   Cigarette Heavy tobacco smoker 1 Packs        TOBACCO CESSATION INFORMATION  Date Counseled By Order Status Description Code Tobacco Cessation Information   05/09/2017 Rosa Lozano Tobacco cessation counseling completed   Smoking cessation education   05/09/2017 Rosa Lozano Tobacco cessation counseling completed   Tobacco cessation counseling   05/19/2017 Yvonne Ordonez CMA Tobacco cessation counseling completed   Smoking cessation education   05/19/2017 Yvonne Ordonez CMA Tobacco cessation counseling completed   Tobacco cessation counseling   07/06/2017 Yvonne Ordonez CMA Tobacco cessation counseling completed   Smoking cessation education   07/06/2017 Yvonne Ordonez CMA Tobacco cessation counseling completed   Tobacco cessation counseling   05/29/2018 Tracey Neely Tobacco cessation counseling completed   Smoking cessation education   06/15/2018 Tracey Neely Tobacco cessation counseling completed   Smoking cessation education   07/03/2018 Yvonne Ordonez Tobacco cessation counseling completed   Smoking cessation education   03/28/2019 Ashley Collester Tobacco cessation counseling completed   Smoking effects education (procedure)   10/24/2019 Allegra Smth Tobacco cessation counseling completed   Smoking cessation education (procedure)   12/19/2019 Allegra Smth Tobacco cessation counseling completed   Smoking cessation education   11/16/2020 Hope Ira Tobacco cessation counseling completed   Smoking cessation assistance     VAPING USE  Screened for vaping? Yes  Status: Not a current user    TOBACCO/VAPING EXPOSURE  No passive vaping exposure.  No passive smoke exposure.    ALCOHOL  There is no history of alcohol use.           Review of  System  Review of Systems  System Neg/Pos Details   Constitutional Negative Chills, Fatigue, Fever, Malaise, Night sweats, Weight gain and Weight loss.   ENMT Negative Ear drainage, Hearing loss, Nasal drainage, Otalgia, Sinus pressure and Sore throat.   Eyes Negative Eye discharge, Eye pain and Vision changes.   Respiratory Negative Chronic cough, Cough, Dyspnea, Known TB exposure and Wheezing.   Cardio Negative Chest pain, Claudication, Edema and Irregular heartbeat/palpitations.   GI Negative Abdominal pain, Blood in stool, Change in stool pattern, Constipation, Decreased appetite, Diarrhea, Heartburn, Nausea and Vomiting.    Negative Dysuria, Hematuria, Polyuria (Genitourinary), Urinary frequency, Urinary incontinence and Urinary retention.   Endocrine Negative Cold intolerance, Heat intolerance, Polydipsia and Polyphagia.   Neuro Negative Dizziness, Extremity weakness, Gait disturbance, Headache, Memory impairment, Numbness in extremity, Seizures and Tremors.   Psych Negative Anxiety, Depression and Insomnia.   Integumentary Negative Brittle hair, Brittle nails, Change in shape/size of mole(s), Hair loss, Hirsutism, Hives, Pruritus, Rash and Skin lesion.   MS Negative Back pain, Joint pain, Joint swelling, Muscle weakness and Neck pain.   Hema/Lymph Negative Easy bleeding, Easy bruising and Lymphadenopathy.   Allergic/Immuno Negative Contact allergy, Environmental allergies, Food allergies and Seasonal allergies.   Reproductive Negative Breast discharge and Breast lumps.         Vital Signs     Time BP mm/Hg Pulse /min Resp /min Temp F Ht ft Ht in Ht cm Wt lb Wt kg BMI kg/m2 BSA m2 O2 Sat%   2:56 /70 89 18 98.3 5 1 154.94 105.4 47.809 19.92 1.43 98     Measured By  Time Measured by   2:56 PM [facility]   Screening Summary:  The following were reviewed: tobacco use, alcohol use, drugs of abuse and date of last pap    Physical Exam  Exam Findings Details   Constitutional Normal Well developed.   Neck  Exam Normal Palpation - Normal.   Respiratory Normal Inspection - Normal.   Cardiovascular Normal Regular rhythm.  No murmurs, gallops, or rubs.   Abdomen Normal Anterior palpation -  No rebound. No abdominal tenderness. No hepatic enlargement. No hernia.   Genitourinary * Pelvic deferred.   Skin Normal Inspection - Normal.   Psychiatric Normal Orientation - Oriented to time, place, person & situation. Appropriate mood and affect.           Completed Orders (this encounter)  Order Details Reason Side Interpretation Result Initial Treatment Date Region   Tobacco cessation counseling          Patient Health Questionnaire (PHQ-2)    Further testing is not required 0     Pre-Visit Planning            Assessment/Plan  # Detail Type Description    Assessment Body mass index (BMI) 19.9 or less, adult (Z68.1).         2. Assessment Cyst of right ovary (N83.201).         3. Assessment Sterilization consult (Z30.09).         4. Assessment Metrorrhagia (N92.1).    Patient Plan  Pt to be at the hospital  day of procedure.  Pt educated to not eat or drink after midnight the day before surgery which includes water,mint or gum. You may brush your teeth.  Do NOT smoke, chew tobacco, or drink alcohol within 24 hours prior to surgery.  Wear clean, comfortable clothes and socks. No NOt wear contact lenses or make-up or dark nail polish.  Bring a case for your glasses if applicable.  Please keep your (red band - blood bank armband) and continue to wear until discharged after surgery.  Call if Questions Regarding Surgery                Diagnostic History Entered Today  Performed Study Interpretation Result   11/16/2020 Pre-Visit Planning     Clinical Guidelines (Reviewed, updated)  Guidelines Status Due Action Last Addressed   Depression screening completed 11/16/2021 Completed on 11/16/2020 11/16/2020   PAP completed 05/29/2021 Completed on 05/29/2018 05/29/2018   Pap Image Guided, Ct-Ng, rfx HPV ASCU completed 05/29/2023 Completed on  05/29/2018 05/29/2018   Pap/HPV testing completed 05/29/2023 Completed on 05/29/2018 05/29/2018   Pre-Visit Planning completed 11/23/2020 Completed on 11/16/2020 11/16/2020       Patient Education  # Patient Education   1. Ortho Micronor 0.35 mg tablet     Medications (Added, Continued or Stopped today):  Start Date Medication Directions PRN Status PRN Reason Instruction Stop Date   11/03/2020 Ortho Micronor 0.35 mg tablet take 1 tablet by oral route  every day N          Counseling / Educational Factors:  Counseling / educational factors reviewed.

## 2020-11-18 ENCOUNTER — ANESTHESIA (OUTPATIENT)
Dept: PERIOP | Facility: HOSPITAL | Age: 40
End: 2020-11-18

## 2020-11-18 ENCOUNTER — APPOINTMENT (OUTPATIENT)
Dept: GENERAL RADIOLOGY | Facility: HOSPITAL | Age: 40
End: 2020-11-18

## 2020-11-18 ENCOUNTER — ANESTHESIA EVENT (OUTPATIENT)
Dept: PERIOP | Facility: HOSPITAL | Age: 40
End: 2020-11-18

## 2020-11-18 ENCOUNTER — HOSPITAL ENCOUNTER (OUTPATIENT)
Facility: HOSPITAL | Age: 40
Setting detail: HOSPITAL OUTPATIENT SURGERY
Discharge: HOME | End: 2020-11-18
Attending: OBSTETRICS & GYNECOLOGY | Admitting: OBSTETRICS & GYNECOLOGY

## 2020-11-18 VITALS
DIASTOLIC BLOOD PRESSURE: 65 MMHG | BODY MASS INDEX: 18.2 KG/M2 | OXYGEN SATURATION: 98 % | RESPIRATION RATE: 15 BRPM | WEIGHT: 106.6 LBS | HEIGHT: 64 IN | SYSTOLIC BLOOD PRESSURE: 108 MMHG | HEART RATE: 64 BPM | TEMPERATURE: 98 F

## 2020-11-18 DIAGNOSIS — Z30.2 STERILIZATION: ICD-10-CM

## 2020-11-18 DIAGNOSIS — N83.202 LEFT OVARIAN CYST: ICD-10-CM

## 2020-11-18 DIAGNOSIS — N92.1 METRORRHAGIA: ICD-10-CM

## 2020-11-18 DIAGNOSIS — N83.202 OVARIAN CYST, BILATERAL: ICD-10-CM

## 2020-11-18 DIAGNOSIS — Z30.2 ENCOUNTER FOR FEMALE STERILIZATION PROCEDURE: ICD-10-CM

## 2020-11-18 DIAGNOSIS — N83.201 OVARIAN CYST, BILATERAL: ICD-10-CM

## 2020-11-18 LAB
B-HCG UR QL: NEGATIVE
INTERNAL NEGATIVE CONTROL: NEGATIVE
INTERNAL POSITIVE CONTROL: POSITIVE
Lab: NORMAL

## 2020-11-18 PROCEDURE — 25010000002 NEOSTIGMINE 10 MG/10ML SOLUTION: Performed by: NURSE ANESTHETIST, CERTIFIED REGISTERED

## 2020-11-18 PROCEDURE — 25010000002 KETOROLAC TROMETHAMINE PER 15 MG: Performed by: NURSE ANESTHETIST, CERTIFIED REGISTERED

## 2020-11-18 PROCEDURE — 25010000002 ONDANSETRON PER 1 MG: Performed by: NURSE ANESTHETIST, CERTIFIED REGISTERED

## 2020-11-18 PROCEDURE — 25010000002 GENTAMICIN PER 80 MG: Performed by: NURSE PRACTITIONER

## 2020-11-18 PROCEDURE — 25010000002 MIDAZOLAM PER 1 MG: Performed by: NURSE ANESTHETIST, CERTIFIED REGISTERED

## 2020-11-18 PROCEDURE — 25010000002 PROPOFOL 10 MG/ML EMULSION: Performed by: NURSE ANESTHETIST, CERTIFIED REGISTERED

## 2020-11-18 PROCEDURE — 81025 URINE PREGNANCY TEST: CPT | Performed by: ANESTHESIOLOGY

## 2020-11-18 PROCEDURE — 25010000002 FENTANYL CITRATE (PF) 100 MCG/2ML SOLUTION: Performed by: NURSE ANESTHETIST, CERTIFIED REGISTERED

## 2020-11-18 RX ORDER — ROCURONIUM BROMIDE 10 MG/ML
INJECTION, SOLUTION INTRAVENOUS AS NEEDED
Status: DISCONTINUED | OUTPATIENT
Start: 2020-11-18 | End: 2020-11-18 | Stop reason: SURG

## 2020-11-18 RX ORDER — MAGNESIUM HYDROXIDE 1200 MG/15ML
LIQUID ORAL AS NEEDED
Status: DISCONTINUED | OUTPATIENT
Start: 2020-11-18 | End: 2020-11-18 | Stop reason: HOSPADM

## 2020-11-18 RX ORDER — BUPIVACAINE HYDROCHLORIDE AND EPINEPHRINE 2.5; 5 MG/ML; UG/ML
INJECTION, SOLUTION EPIDURAL; INFILTRATION; INTRACAUDAL; PERINEURAL AS NEEDED
Status: DISCONTINUED | OUTPATIENT
Start: 2020-11-18 | End: 2020-11-18 | Stop reason: HOSPADM

## 2020-11-18 RX ORDER — SODIUM CHLORIDE 0.9 % (FLUSH) 0.9 %
10 SYRINGE (ML) INJECTION AS NEEDED
Status: DISCONTINUED | OUTPATIENT
Start: 2020-11-18 | End: 2020-11-18 | Stop reason: HOSPADM

## 2020-11-18 RX ORDER — SODIUM CHLORIDE 0.9 % (FLUSH) 0.9 %
10 SYRINGE (ML) INJECTION EVERY 12 HOURS SCHEDULED
Status: DISCONTINUED | OUTPATIENT
Start: 2020-11-18 | End: 2020-11-18 | Stop reason: HOSPADM

## 2020-11-18 RX ORDER — MIDAZOLAM HYDROCHLORIDE 1 MG/ML
INJECTION INTRAMUSCULAR; INTRAVENOUS AS NEEDED
Status: DISCONTINUED | OUTPATIENT
Start: 2020-11-18 | End: 2020-11-18 | Stop reason: SURG

## 2020-11-18 RX ORDER — SODIUM CHLORIDE, SODIUM LACTATE, POTASSIUM CHLORIDE, CALCIUM CHLORIDE 600; 310; 30; 20 MG/100ML; MG/100ML; MG/100ML; MG/100ML
125 INJECTION, SOLUTION INTRAVENOUS ONCE
Status: COMPLETED | OUTPATIENT
Start: 2020-11-18 | End: 2020-11-18

## 2020-11-18 RX ORDER — PROPOFOL 10 MG/ML
VIAL (ML) INTRAVENOUS AS NEEDED
Status: DISCONTINUED | OUTPATIENT
Start: 2020-11-18 | End: 2020-11-18 | Stop reason: SURG

## 2020-11-18 RX ORDER — OXYCODONE HYDROCHLORIDE AND ACETAMINOPHEN 5; 325 MG/1; MG/1
1 TABLET ORAL ONCE AS NEEDED
Status: DISCONTINUED | OUTPATIENT
Start: 2020-11-18 | End: 2020-11-18 | Stop reason: HOSPADM

## 2020-11-18 RX ORDER — GLYCOPYRROLATE 0.2 MG/ML
INJECTION INTRAMUSCULAR; INTRAVENOUS AS NEEDED
Status: DISCONTINUED | OUTPATIENT
Start: 2020-11-18 | End: 2020-11-18 | Stop reason: SURG

## 2020-11-18 RX ORDER — MEPERIDINE HYDROCHLORIDE 25 MG/ML
12.5 INJECTION INTRAMUSCULAR; INTRAVENOUS; SUBCUTANEOUS
Status: DISCONTINUED | OUTPATIENT
Start: 2020-11-18 | End: 2020-11-18 | Stop reason: HOSPADM

## 2020-11-18 RX ORDER — ONDANSETRON 2 MG/ML
4 INJECTION INTRAMUSCULAR; INTRAVENOUS AS NEEDED
Status: DISCONTINUED | OUTPATIENT
Start: 2020-11-18 | End: 2020-11-18 | Stop reason: HOSPADM

## 2020-11-18 RX ORDER — HYDROCODONE BITARTRATE AND ACETAMINOPHEN 5; 325 MG/1; MG/1
1 TABLET ORAL EVERY 6 HOURS PRN
Qty: 10 TABLET | Refills: 0 | Status: SHIPPED | OUTPATIENT
Start: 2020-11-18

## 2020-11-18 RX ORDER — KETOROLAC TROMETHAMINE 30 MG/ML
INJECTION, SOLUTION INTRAMUSCULAR; INTRAVENOUS AS NEEDED
Status: DISCONTINUED | OUTPATIENT
Start: 2020-11-18 | End: 2020-11-18 | Stop reason: SURG

## 2020-11-18 RX ORDER — IBUPROFEN 600 MG/1
600 TABLET ORAL EVERY 6 HOURS PRN
Qty: 30 TABLET | Refills: 0 | Status: SHIPPED | OUTPATIENT
Start: 2020-11-18 | End: 2020-12-18

## 2020-11-18 RX ORDER — NEOSTIGMINE METHYLSULFATE 1 MG/ML
INJECTION, SOLUTION INTRAVENOUS AS NEEDED
Status: DISCONTINUED | OUTPATIENT
Start: 2020-11-18 | End: 2020-11-18 | Stop reason: SURG

## 2020-11-18 RX ORDER — CLINDAMYCIN PHOSPHATE 900 MG/50ML
900 INJECTION INTRAVENOUS ONCE
Status: DISCONTINUED | OUTPATIENT
Start: 2020-11-18 | End: 2020-11-18 | Stop reason: HOSPADM

## 2020-11-18 RX ORDER — SODIUM CHLORIDE, SODIUM LACTATE, POTASSIUM CHLORIDE, CALCIUM CHLORIDE 600; 310; 30; 20 MG/100ML; MG/100ML; MG/100ML; MG/100ML
INJECTION, SOLUTION INTRAVENOUS CONTINUOUS PRN
Status: DISCONTINUED | OUTPATIENT
Start: 2020-11-18 | End: 2020-11-18 | Stop reason: SURG

## 2020-11-18 RX ORDER — IPRATROPIUM BROMIDE AND ALBUTEROL SULFATE 2.5; .5 MG/3ML; MG/3ML
3 SOLUTION RESPIRATORY (INHALATION) ONCE AS NEEDED
Status: DISCONTINUED | OUTPATIENT
Start: 2020-11-18 | End: 2020-11-18 | Stop reason: HOSPADM

## 2020-11-18 RX ORDER — FENTANYL CITRATE 50 UG/ML
50 INJECTION, SOLUTION INTRAMUSCULAR; INTRAVENOUS
Status: DISCONTINUED | OUTPATIENT
Start: 2020-11-18 | End: 2020-11-18 | Stop reason: HOSPADM

## 2020-11-18 RX ORDER — FENTANYL CITRATE 50 UG/ML
INJECTION, SOLUTION INTRAMUSCULAR; INTRAVENOUS AS NEEDED
Status: DISCONTINUED | OUTPATIENT
Start: 2020-11-18 | End: 2020-11-18 | Stop reason: SURG

## 2020-11-18 RX ORDER — ONDANSETRON 2 MG/ML
INJECTION INTRAMUSCULAR; INTRAVENOUS AS NEEDED
Status: DISCONTINUED | OUTPATIENT
Start: 2020-11-18 | End: 2020-11-18 | Stop reason: SURG

## 2020-11-18 RX ORDER — MIDAZOLAM HYDROCHLORIDE 1 MG/ML
1 INJECTION INTRAMUSCULAR; INTRAVENOUS
Status: DISCONTINUED | OUTPATIENT
Start: 2020-11-18 | End: 2020-11-18 | Stop reason: HOSPADM

## 2020-11-18 RX ORDER — LIDOCAINE HYDROCHLORIDE 20 MG/ML
INJECTION, SOLUTION INFILTRATION; PERINEURAL AS NEEDED
Status: DISCONTINUED | OUTPATIENT
Start: 2020-11-18 | End: 2020-11-18 | Stop reason: SURG

## 2020-11-18 RX ORDER — BUPIVACAINE HYDROCHLORIDE 5 MG/ML
INJECTION, SOLUTION PERINEURAL AS NEEDED
Status: DISCONTINUED | OUTPATIENT
Start: 2020-11-18 | End: 2020-11-18 | Stop reason: HOSPADM

## 2020-11-18 RX ORDER — SODIUM CHLORIDE 0.9 % (FLUSH) 0.9 %
3 SYRINGE (ML) INJECTION EVERY 12 HOURS SCHEDULED
Status: DISCONTINUED | OUTPATIENT
Start: 2020-11-18 | End: 2020-11-18 | Stop reason: HOSPADM

## 2020-11-18 RX ORDER — FAMOTIDINE 10 MG/ML
INJECTION, SOLUTION INTRAVENOUS AS NEEDED
Status: DISCONTINUED | OUTPATIENT
Start: 2020-11-18 | End: 2020-11-18 | Stop reason: SURG

## 2020-11-18 RX ADMIN — SODIUM CHLORIDE, POTASSIUM CHLORIDE, SODIUM LACTATE AND CALCIUM CHLORIDE: 600; 310; 30; 20 INJECTION, SOLUTION INTRAVENOUS at 10:04

## 2020-11-18 RX ADMIN — FAMOTIDINE 20 MG: 10 INJECTION INTRAVENOUS at 10:04

## 2020-11-18 RX ADMIN — GENTAMICIN SULFATE 250 MG: 40 INJECTION, SOLUTION INTRAMUSCULAR; INTRAVENOUS at 10:09

## 2020-11-18 RX ADMIN — FENTANYL CITRATE 100 MCG: 50 INJECTION INTRAMUSCULAR; INTRAVENOUS at 10:11

## 2020-11-18 RX ADMIN — ROCURONIUM BROMIDE 30 MG: 10 SOLUTION INTRAVENOUS at 10:11

## 2020-11-18 RX ADMIN — GLYCOPYRROLATE 0.4 MG: 0.2 INJECTION, SOLUTION INTRAMUSCULAR; INTRAVENOUS at 10:51

## 2020-11-18 RX ADMIN — KETOROLAC TROMETHAMINE 30 MG: 30 INJECTION, SOLUTION INTRAMUSCULAR; INTRAVENOUS at 10:43

## 2020-11-18 RX ADMIN — NEOSTIGMINE METHYLSULFATE 3 MG: 1 INJECTION, SOLUTION INTRAVENOUS at 10:52

## 2020-11-18 RX ADMIN — MIDAZOLAM HYDROCHLORIDE 2 MG: 1 INJECTION, SOLUTION INTRAMUSCULAR; INTRAVENOUS at 10:04

## 2020-11-18 RX ADMIN — LIDOCAINE HYDROCHLORIDE 40 MG: 20 INJECTION, SOLUTION INFILTRATION; PERINEURAL at 10:11

## 2020-11-18 RX ADMIN — ONDANSETRON 4 MG: 2 INJECTION INTRAMUSCULAR; INTRAVENOUS at 10:04

## 2020-11-18 RX ADMIN — SODIUM CHLORIDE, POTASSIUM CHLORIDE, SODIUM LACTATE AND CALCIUM CHLORIDE 125 ML/HR: 600; 310; 30; 20 INJECTION, SOLUTION INTRAVENOUS at 08:53

## 2020-11-18 RX ADMIN — PROPOFOL 150 MG: 10 INJECTION, EMULSION INTRAVENOUS at 10:11

## 2020-11-18 NOTE — ANESTHESIA PREPROCEDURE EVALUATION
Anesthesia Evaluation     Patient summary reviewed and Nursing notes reviewed   no history of anesthetic complications:  NPO Solid Status: > 8 hours             Airway   Mallampati: I  TM distance: >3 FB  Neck ROM: full  no difficulty expected  Dental    (+) partials    Pulmonary - normal exam   (+) a smoker Current Smoked day of surgery, COPD mild, asthma,  Cardiovascular - normal exam  Exercise tolerance: good (4-7 METS)    NYHA Classification: I    (+) valvular problems/murmurs,   (-) hypertension, past MI, dysrhythmias, angina, CHF      Neuro/Psych  (+) numbness,     (-) seizures, CVA  GI/Hepatic/Renal/Endo - negative ROS   (-) GERD, diabetes    Musculoskeletal     (+) back pain,   Abdominal  - normal exam    Bowel sounds: normal.   Substance History - negative use     OB/GYN negative ob/gyn ROS         Other   arthritis,                        Anesthesia Plan    ASA 2     general     intravenous induction     Anesthetic plan, all risks, benefits, and alternatives have been provided, discussed and informed consent has been obtained with: patient and spouse.  Use of blood products discussed with patient and spouse .

## 2020-11-18 NOTE — OP NOTE
SALPINGECTOMY LAPAROSCOPIC, DILATATION AND CURETTAGE HYSTEROSCOPY THERMAL ABLATION, OVARIAN CYST LAPAROSCOPIC DRAINAGE/EXCISION  Procedure Note    Ruthie Mederos  11/18/2020    Pre-op Diagnosis:   Metrorrhagia  Desire for permanent sterilization  Complex right ovarian cyst  Left labia majora nodule    Post-op Diagnosis:     Metrorrhagia  Desire for permanent sterilization  Complex right ovarian cyst  Left labia majora nodule    Procedure(s):  RIGHT SALPINGECTOMY LAPAROSCOPIC  DILATATION AND CURETTAGE HYSTEROSCOPY ABLATION, LEFT GROIN SEBACEOUS CYST REMOVAL  RIGHT OVARIAN CYSTECTOMY    Surgeon(s):  Beka Fonseca DO    Anesthesia: General    Estimated Blood Loss: minimal    Specimens:  Order Name Source Comment Collection Info Order Time   TISSUE PATHOLOGY EXAM Fallopian Tube, Right  Collected By: Beka Fonseca DO 11/18/2020 10:39 AM         Procedure:  The patient was taken to the operating room, given general anesthesia, prepped and draped in the usual sterile fashion.  The bladder was drained with a straight catheter.  A speculum was placed into the vagina and a uterine manipulator was placed.  The surgeon was regloved and attention made to the abdomen.      A one centimeter incision was made into the umbilicus and an optiview trocar was place into the abdomen under direct visualization using the laparoscope.  The abdomen was filled with CO2 gas up to 15mm mercury pressure.  A second eight millimeter trocar was placed suprapubically in the midline.  A third 5 mm trocar was placed in the left lower quadrant.  The right fallopian tube was picked up and cauterized under using the Enseal device.  We remove the entire fallopian tube all the way to the cornua.  We used the Enseal device to excise a cyst off of the right ovary as well.  The rest of the abdomen and pelvis was grossly normal.     We removed the CO2 gas and trocars.  The skin incisions were closed with a 4-0 monocryl and surgical adhesive.       Next we did the NovaSure endometrial ablation.  We placed a speculum into the vagina and grasped the anterior lip of the cervix with a toothed tenaculum.  We inserted the hysteroscope.  The endometrium appeared normal.  We then dilated the cervix.  We then did a sharp curettage in all quadrants of the uterus until a fine gritty texture was noted.  We sent endometrial curettings to pathology as a specimen.  Next we inserted the NovaSure device.  It passed the cavity integrity assessment.  We did the ablation sequence for approximately a minute and half.  Once this was done we reinserted the hysteroscope and there was a good gideon throughout the entire endometrial cavity.    Next we made an incision over the area in the left labia majora.  There was a 1 cm nodule that turned out to be a sebaceous cyst.  We drained all the material out of the sebaceous cyst and remove the cyst wall in its entirety.  We closed it with a 4-0 Monocryl interrupted stitch.    Procedure was then finished there were no complications     Sponge, lap and needle counts were correct.       Findings: Normal pelvis     Complications: none    Grafts or Implants: NA    Beka Fonseca DO     Date: 11/18/2020  Time: 11:08 EST

## 2020-11-18 NOTE — ANESTHESIA PROCEDURE NOTES
Airway  Urgency: elective    Date/Time: 11/18/2020 10:15 AM  Airway not difficult    General Information and Staff    Patient location during procedure: OR  CRNA: [place] CRNA    Indications and Patient Condition  Indications for airway management: airway protection    Preoxygenated: yes  MILS maintained throughout  Mask difficulty assessment: 1 - vent by mask    Final Airway Details  Final airway type: endotracheal airway      Successful airway: ETT  Cuffed: yes   Successful intubation technique: direct laryngoscopy  Facilitating devices/methods: intubating stylet  Endotracheal tube insertion site: oral  Blade: Kaur  Blade size: 2  ETT size (mm): 7.0  Cormack-Lehane Classification: grade I - full view of glottis  Placement verified by: chest auscultation   Measured from: lips  ETT/EBT  to lips (cm): 20  Number of attempts at approach: 1  Assessment: lips, teeth, and gum same as pre-op and atraumatic intubation

## 2020-11-18 NOTE — INTERVAL H&P NOTE
H&P updated. The patient was examined and Notes a nodule in her left vulvar that has been present for over 6 months.  She would like this removed.  We discussed the risks and benefits.  We will add this onto the case today.

## 2020-11-18 NOTE — NURSING NOTE
Confirmed side with patient and dr garcia. Pt wanted to add left groin nodule removal-dr garcia confirmed.

## 2020-11-18 NOTE — ANESTHESIA POSTPROCEDURE EVALUATION
Patient: Ruthie Mederos    Procedure Summary     Date: 11/18/20 Room / Location: [facility] OR  /  [facility] OR    Anesthesia Start: 1004 Anesthesia Stop: 1103    Procedures:       RIGHT SALPINGECTOMY LAPAROSCOPIC (Right Vagina)      DILATATION AND CURETTAGE HYSTEROSCOPY ABLATION, LEFT GROIN SEBACEOUS CYST REMOVAL (N/A Vagina)      RIGHT OVARIAN CYSTECTOMY (Right Vagina) Diagnosis: (Z30.2  N83.202)    Surgeon: Beka Fonseca DO Provider: Alvin Mccarty MD    Anesthesia Type: general ASA Status: 2          Anesthesia Type: general    Vitals  Vitals Value Taken Time   BP 99/78 11/18/20 1119   Temp 98.5 °F (36.9 °C) 11/18/20 1104   Pulse 59 11/18/20 1119   Resp 12 11/18/20 1119   SpO2 100 % 11/18/20 1119           Post Anesthesia Care and Evaluation    Patient location during evaluation: bedside  Patient participation: complete - patient participated  Level of consciousness: awake and alert  Pain score: 1  Pain management: adequate  Airway patency: patent  Anesthetic complications: No anesthetic complications  PONV Status: none  Cardiovascular status: acceptable  Respiratory status: acceptable  Hydration status: acceptable

## 2020-11-23 LAB
LAB AP CASE REPORT: NORMAL
PATH REPORT.FINAL DX SPEC: NORMAL

## 2023-11-18 ENCOUNTER — APPOINTMENT (OUTPATIENT)
Dept: CT IMAGING | Facility: HOSPITAL | Age: 43
End: 2023-11-18

## 2023-11-18 ENCOUNTER — HOSPITAL ENCOUNTER (EMERGENCY)
Facility: HOSPITAL | Age: 43
Discharge: HOME | End: 2023-11-18
Attending: FAMILY MEDICINE

## 2023-11-18 VITALS
HEIGHT: 63 IN | DIASTOLIC BLOOD PRESSURE: 80 MMHG | TEMPERATURE: 98.9 F | HEART RATE: 56 BPM | WEIGHT: 98 LBS | RESPIRATION RATE: 18 BRPM | SYSTOLIC BLOOD PRESSURE: 113 MMHG | OXYGEN SATURATION: 100 % | BODY MASS INDEX: 17.36 KG/M2

## 2023-11-18 DIAGNOSIS — R11.2 NAUSEA AND VOMITING, UNSPECIFIED VOMITING TYPE: Primary | ICD-10-CM

## 2023-11-18 DIAGNOSIS — R10.9 ABDOMINAL PAIN, UNSPECIFIED ABDOMINAL LOCATION: ICD-10-CM

## 2023-11-18 LAB
ALBUMIN SERPL-MCNC: 4.4 G/DL (ref 3.5–5.2)
ALBUMIN/GLOB SERPL: 1.8 G/DL
ALP SERPL-CCNC: 80 U/L (ref 39–117)
ALT SERPL W P-5'-P-CCNC: 12 U/L (ref 1–33)
ANION GAP SERPL CALCULATED.3IONS-SCNC: 9.1 MMOL/L (ref 5–15)
AST SERPL-CCNC: 14 U/L (ref 1–32)
BASOPHILS # BLD AUTO: 0.07 10*3/MM3 (ref 0–0.2)
BASOPHILS NFR BLD AUTO: 0.5 % (ref 0–1.5)
BILIRUB SERPL-MCNC: 0.9 MG/DL (ref 0–1.2)
BILIRUB UR QL STRIP: NEGATIVE
BUN SERPL-MCNC: 11 MG/DL (ref 6–20)
BUN/CREAT SERPL: 15.9 (ref 7–25)
CALCIUM SPEC-SCNC: 9.6 MG/DL (ref 8.6–10.5)
CHLORIDE SERPL-SCNC: 104 MMOL/L (ref 98–107)
CLARITY UR: ABNORMAL
CO2 SERPL-SCNC: 26.9 MMOL/L (ref 22–29)
COLOR UR: YELLOW
CREAT SERPL-MCNC: 0.69 MG/DL (ref 0.57–1)
DEPRECATED RDW RBC AUTO: 46.8 FL (ref 37–54)
EGFRCR SERPLBLD CKD-EPI 2021: 110.6 ML/MIN/1.73
EOSINOPHIL # BLD AUTO: 0.09 10*3/MM3 (ref 0–0.4)
EOSINOPHIL NFR BLD AUTO: 0.6 % (ref 0.3–6.2)
ERYTHROCYTE [DISTWIDTH] IN BLOOD BY AUTOMATED COUNT: 13.3 % (ref 12.3–15.4)
GLOBULIN UR ELPH-MCNC: 2.5 GM/DL
GLUCOSE SERPL-MCNC: 114 MG/DL (ref 65–99)
GLUCOSE UR STRIP-MCNC: NEGATIVE MG/DL
HCT VFR BLD AUTO: 42.1 % (ref 34–46.6)
HGB BLD-MCNC: 13.8 G/DL (ref 12–15.9)
HGB UR QL STRIP.AUTO: NEGATIVE
HOLD SPECIMEN: NORMAL
HOLD SPECIMEN: NORMAL
IMM GRANULOCYTES # BLD AUTO: 0.07 10*3/MM3 (ref 0–0.05)
IMM GRANULOCYTES NFR BLD AUTO: 0.5 % (ref 0–0.5)
KETONES UR QL STRIP: ABNORMAL
LEUKOCYTE ESTERASE UR QL STRIP.AUTO: NEGATIVE
LIPASE SERPL-CCNC: 19 U/L (ref 13–60)
LYMPHOCYTES # BLD AUTO: 1.17 10*3/MM3 (ref 0.7–3.1)
LYMPHOCYTES NFR BLD AUTO: 8.1 % (ref 19.6–45.3)
MCH RBC QN AUTO: 30.9 PG (ref 26.6–33)
MCHC RBC AUTO-ENTMCNC: 32.8 G/DL (ref 31.5–35.7)
MCV RBC AUTO: 94.2 FL (ref 79–97)
MONOCYTES # BLD AUTO: 0.63 10*3/MM3 (ref 0.1–0.9)
MONOCYTES NFR BLD AUTO: 4.4 % (ref 5–12)
NEUTROPHILS NFR BLD AUTO: 12.45 10*3/MM3 (ref 1.7–7)
NEUTROPHILS NFR BLD AUTO: 85.9 % (ref 42.7–76)
NITRITE UR QL STRIP: NEGATIVE
NRBC BLD AUTO-RTO: 0 /100 WBC (ref 0–0.2)
PH UR STRIP.AUTO: 8 [PH] (ref 5–8)
PLATELET # BLD AUTO: 239 10*3/MM3 (ref 140–450)
PMV BLD AUTO: 11.9 FL (ref 6–12)
POTASSIUM SERPL-SCNC: 4.6 MMOL/L (ref 3.5–5.2)
PROT SERPL-MCNC: 6.9 G/DL (ref 6–8.5)
PROT UR QL STRIP: NEGATIVE
RBC # BLD AUTO: 4.47 10*6/MM3 (ref 3.77–5.28)
SODIUM SERPL-SCNC: 140 MMOL/L (ref 136–145)
SP GR UR STRIP: 1.02 (ref 1–1.03)
UROBILINOGEN UR QL STRIP: ABNORMAL
WBC NRBC COR # BLD AUTO: 14.48 10*3/MM3 (ref 3.4–10.8)
WHOLE BLOOD HOLD COAG: NORMAL
WHOLE BLOOD HOLD SPECIMEN: NORMAL

## 2023-11-18 PROCEDURE — 85025 COMPLETE CBC W/AUTO DIFF WBC: CPT | Performed by: PHYSICIAN ASSISTANT

## 2023-11-18 PROCEDURE — 25810000003 SODIUM CHLORIDE 0.9 % SOLUTION: Performed by: PHYSICIAN ASSISTANT

## 2023-11-18 PROCEDURE — 74177 CT ABD & PELVIS W/CONTRAST: CPT | Performed by: RADIOLOGY

## 2023-11-18 PROCEDURE — 99285 EMERGENCY DEPT VISIT HI MDM: CPT

## 2023-11-18 PROCEDURE — 80053 COMPREHEN METABOLIC PANEL: CPT | Performed by: PHYSICIAN ASSISTANT

## 2023-11-18 PROCEDURE — 74177 CT ABD & PELVIS W/CONTRAST: CPT

## 2023-11-18 PROCEDURE — 83690 ASSAY OF LIPASE: CPT | Performed by: PHYSICIAN ASSISTANT

## 2023-11-18 PROCEDURE — 81003 URINALYSIS AUTO W/O SCOPE: CPT | Performed by: FAMILY MEDICINE

## 2023-11-18 PROCEDURE — 25510000001 IOPAMIDOL 61 % SOLUTION: Performed by: FAMILY MEDICINE

## 2023-11-18 RX ORDER — DICYCLOMINE HCL 20 MG
20 TABLET ORAL EVERY 6 HOURS PRN
Qty: 20 TABLET | Refills: 0 | Status: SHIPPED | OUTPATIENT
Start: 2023-11-18

## 2023-11-18 RX ORDER — KETOROLAC TROMETHAMINE 30 MG/ML
30 INJECTION, SOLUTION INTRAMUSCULAR; INTRAVENOUS EVERY 6 HOURS PRN
Status: DISCONTINUED | OUTPATIENT
Start: 2023-11-18 | End: 2023-11-18 | Stop reason: HOSPADM

## 2023-11-18 RX ORDER — ONDANSETRON 4 MG/1
4 TABLET, ORALLY DISINTEGRATING ORAL EVERY 6 HOURS PRN
Qty: 12 TABLET | Refills: 0 | Status: SHIPPED | OUTPATIENT
Start: 2023-11-18

## 2023-11-18 RX ORDER — NAPROXEN 500 MG/1
500 TABLET ORAL 2 TIMES DAILY PRN
Qty: 15 TABLET | Refills: 0 | Status: SHIPPED | OUTPATIENT
Start: 2023-11-18

## 2023-11-18 RX ORDER — SODIUM CHLORIDE 0.9 % (FLUSH) 0.9 %
10 SYRINGE (ML) INJECTION AS NEEDED
Status: DISCONTINUED | OUTPATIENT
Start: 2023-11-18 | End: 2023-11-18 | Stop reason: HOSPADM

## 2023-11-18 RX ADMIN — SODIUM CHLORIDE 1000 ML: 9 INJECTION, SOLUTION INTRAVENOUS at 15:02

## 2023-11-18 RX ADMIN — IOPAMIDOL 60 ML: 612 INJECTION, SOLUTION INTRAVENOUS at 15:54

## 2023-11-18 NOTE — ED PROVIDER NOTES
Subjective   History of Present Illness  43-year-old female who presents to the emergency department with complaint of abdominal and back pain.  Patient states started hurting in her lower back and wraps around to her lower abdomen.  Patient denies any associated fever, chills, body aches.  Patient describes pain as aching, sharp pain.    History provided by:  Patient   used: No    Back Pain  Location:  Generalized  Quality:  Aching  Pain severity:  Moderate  Onset quality:  Gradual  Duration:  2 days  Timing:  Intermittent  Progression:  Worsening  Chronicity:  New  Context: not falling, not jumping from heights, not lifting heavy objects, not MVA, not occupational injury, not pedestrian accident, not physical stress, not recent illness and not recent injury    Relieved by:  Nothing  Worsened by:  Nothing  Ineffective treatments:  None tried  Associated symptoms: abdominal pain    Associated symptoms: no bladder incontinence, no bowel incontinence, no chest pain, no dysuria, no fever, no headaches, no leg pain, no numbness, no perianal numbness, no tingling and no weakness    Risk factors: no hx of cancer, no hx of osteoporosis, no lack of exercise, no menopause, not obese, no recent surgery and no steroid use        Review of Systems   Constitutional: Negative.  Negative for appetite change, chills, diaphoresis, fatigue and fever.   HENT: Negative.  Negative for dental problem, drooling, ear discharge, ear pain, facial swelling, mouth sores, nosebleeds and rhinorrhea.    Eyes: Negative.  Negative for photophobia, pain, redness and itching.   Respiratory: Negative.  Negative for cough, choking, chest tightness, shortness of breath and stridor.    Cardiovascular:  Negative for chest pain.   Gastrointestinal:  Positive for abdominal distention, abdominal pain, nausea and vomiting. Negative for bowel incontinence.   Endocrine: Negative.  Negative for heat intolerance and polydipsia.    Genitourinary: Negative.  Negative for bladder incontinence, dysuria, enuresis, flank pain, frequency, genital sores, hematuria and menstrual problem.   Musculoskeletal:  Positive for back pain. Negative for joint swelling, myalgias and neck stiffness.   Neurological: Negative.  Negative for dizziness, tingling, facial asymmetry, weakness, numbness and headaches.   Hematological: Negative.  Negative for adenopathy. Does not bruise/bleed easily.   Psychiatric/Behavioral: Negative.  Negative for confusion, decreased concentration, dysphoric mood, hallucinations and self-injury. The patient is not nervous/anxious and is not hyperactive.    All other systems reviewed and are negative.      Past Medical History:   Diagnosis Date    Allergy to mold     Anxiety     Arthritis     Asthma     Cancer     CERVIX    Coronary artery disease     Falls     Heart murmur     As a child    History of transfusion     Injury of back     Migraines     Mitral valve regurgitation     Ovarian cyst     Pneumothorax     Rheumatic fever     AGE 11    Sciatica of left side     Sinusitis     Tobacco abuse        Allergies   Allergen Reactions    Topamax [Topiramate] Urinary Retention    Amoxil [Amoxicillin] Rash    Cephalosporins Rash    Penicillins Rash       Past Surgical History:   Procedure Laterality Date    APPENDECTOMY N/A 8/11/2016    Procedure: APPENDECTOMY LAPAROSCOPIC;  Surgeon: Carlos Molina MD;  Location: [facility];  Service:     CHEST TUBE INSERTION      D & C HYSTEROSCOPY ENDOMETRIAL ABLATION N/A 11/18/2020    Procedure: DILATATION AND CURETTAGE HYSTEROSCOPY ABLATION, LEFT GROIN SEBACEOUS CYST REMOVAL;  Surgeon: Beka Fonseca DO;  Location: [facility];  Service: Obstetrics/Gynecology;  Laterality: N/A;    DIAGNOSTIC LAPAROSCOPY N/A 8/11/2016    Procedure: DIAGNOSTIC LAPAROSCOPY, LEFT SALPINGOOPHERECTOMY;  Surgeon: Beka Fonseca DO;  Location: [facility];  Service:     DILATATION AND CURETTAGE      GYNECOLOGIC  CRYOSURGERY      CERVICAL    INCISION AND DRAINAGE LEG      LEFT KNEE POST LACERATION    KNEE SURGERY Left     OTHER SURGICAL HISTORY      external benign vaginal cyst removal    OVARIAN CYST DRAINAGE/EXCISION Right 11/18/2020    Procedure: RIGHT OVARIAN CYSTECTOMY;  Surgeon: Beka Fonseca DO;  Location: [facility];  Service: Obstetrics/Gynecology;  Laterality: Right;    PILONIDAL CYSTECTOMY      SALPINGECTOMY Right 11/18/2020    Procedure: RIGHT SALPINGECTOMY LAPAROSCOPIC;  Surgeon: Beka Fonseca DO;  Location: [facility] OR;  Service: Obstetrics/Gynecology;  Laterality: Right;    WISDOM TOOTH EXTRACTION         Family History   Problem Relation Age of Onset    Hypertension Mother     COPD Mother     Arthritis Mother     Depression Mother     Heart disease Father     Hypertension Father     Arthritis Father     No Known Problems Daughter     No Known Problems Son     Thyroid disease Maternal Aunt     Heart disease Maternal Uncle     Nephrolithiasis Maternal Uncle     Heart disease Maternal Grandmother     Stroke Maternal Grandmother     Hypertension Maternal Grandmother     Diabetes Maternal Grandmother     Cancer Maternal Grandmother     COPD Maternal Grandmother     Nephrolithiasis Maternal Grandmother     Hypertension Maternal Grandfather     COPD Maternal Grandfather     Nephrolithiasis Maternal Grandfather        Social History     Socioeconomic History    Marital status:    Tobacco Use    Smoking status: Every Day     Packs/day: 1.00     Years: 20.00     Additional pack years: 0.00     Total pack years: 20.00     Types: Cigarettes    Smokeless tobacco: Never   Vaping Use    Vaping Use: Never used   Substance and Sexual Activity    Alcohol use: No    Drug use: No     Comment: smoked some pot as a teenager    Sexual activity: Defer           Objective   Physical Exam  Vitals and nursing note reviewed.   Constitutional:       General: She is not in acute distress.     Appearance: She is  well-developed and normal weight. She is not ill-appearing, toxic-appearing or diaphoretic.   HENT:      Head: Normocephalic and atraumatic.      Mouth/Throat:      Mouth: Mucous membranes are moist.      Pharynx: Oropharynx is clear. No pharyngeal swelling or oropharyngeal exudate.   Eyes:      General: No scleral icterus.     Extraocular Movements: Extraocular movements intact.      Pupils: Pupils are equal, round, and reactive to light.   Cardiovascular:      Rate and Rhythm: Normal rate and regular rhythm.      Heart sounds: Normal heart sounds. No murmur heard.     No friction rub. No gallop.   Pulmonary:      Effort: Pulmonary effort is normal. No respiratory distress.      Breath sounds: Normal breath sounds. No stridor. No wheezing, rhonchi or rales.   Abdominal:      General: Abdomen is flat, scaphoid and protuberant. Bowel sounds are normal. There is no distension or abdominal bruit. There are no signs of injury.      Palpations: Abdomen is soft. Abdomen is rigid. There is no shifting dullness, fluid wave, hepatomegaly, splenomegaly or mass.      Tenderness: There is no abdominal tenderness. There is no right CVA tenderness, left CVA tenderness, guarding or rebound. Negative signs include Dimas's sign, Rovsing's sign and McBurney's sign.      Hernia: No hernia is present. There is no hernia in the umbilical area, ventral area, left inguinal area, right femoral area or right inguinal area.   Skin:     General: Skin is warm and dry.      Capillary Refill: Capillary refill takes less than 2 seconds.      Coloration: Skin is not cyanotic, jaundiced, mottled or pale.      Findings: No erythema.   Neurological:      General: No focal deficit present.      Mental Status: She is alert and oriented to person, place, and time. She is disoriented.      Cranial Nerves: No cranial nerve deficit.      Motor: No weakness.   Psychiatric:         Mood and Affect: Mood normal. Mood is not anxious or depressed.          Behavior: Behavior normal.         Procedures           ED Course  ED Course as of 11/18/23 1732   Sat Nov 18, 2023   1708    IMPRESSION:  Appendix not visualized. Otherwise no acute process in the abdomen or  pelvis.   [BH]      ED Course User Index  [BH] Joshua La PA-C                                           Medical Decision Making  Problems Addressed:  Abdominal pain, unspecified abdominal location: complicated acute illness or injury  Nausea and vomiting, unspecified vomiting type: complicated acute illness or injury    Amount and/or Complexity of Data Reviewed  Labs: ordered.  Radiology: ordered.    Risk  Prescription drug management.        Final diagnoses:   Nausea and vomiting, unspecified vomiting type   Abdominal pain, unspecified abdominal location       ED Disposition  ED Disposition       ED Disposition   Discharge    Condition   Stable    Comment   --               No follow-up provider specified.       Medication List        New Prescriptions      dicyclomine 20 MG tablet  Commonly known as: BENTYL  Take 1 tablet by mouth Every 6 (Six) Hours As Needed for Abdominal Cramping.     naproxen 500 MG tablet  Commonly known as: NAPROSYN  Take 1 tablet by mouth 2 (Two) Times a Day As Needed for Mild Pain.     ondansetron ODT 4 MG disintegrating tablet  Commonly known as: ZOFRAN-ODT  Place 1 tablet on the tongue Every 6 (Six) Hours As Needed for Nausea or Vomiting.               Where to Get Your Medications        These medications were sent to [id number] - [place], [place] - [address] [facility] [phone]  - [phone] FX  [address] [place] [address]      Phone: [phone]   dicyclomine 20 MG tablet  naproxen 500 MG tablet  ondansetron ODT 4 MG disintegrating tablet            Joshua La PA-C  11/18/23 1732

## 2023-11-18 NOTE — Clinical Note
[facility] EMERGENCY DEPARTMENT  [address] [address]  Phone: [phone]    Ruthie Mederos was seen and treated in our emergency department on 11/18/2023.  She may return to work on 11/20/2023.         Thank you for choosing [facility].    Servando Easton, RN

## 2023-11-18 NOTE — Clinical Note
[facility] EMERGENCY DEPARTMENT  [address] [address]  Phone: [phone]    Ruthie Mederos was seen and treated in our emergency department on 11/18/2023.  She may return to work on 11/20/2023.         Thank you for choosing [facility].    Servando Easton, RN

## (undated) DEVICE — TISSUE RETRIEVAL SYSTEM: Brand: INZII RETRIEVAL SYSTEM

## (undated) DEVICE — SUT MNCRYL 4/0 PS2 18 IN

## (undated) DEVICE — APPL CHLORAPREP HI/LITE 26ML ORNG

## (undated) DEVICE — GOWN IMPERV 2XL BLU CA/50

## (undated) DEVICE — ENSEAL TEMPERATURE CONTROLLED TISSUE SEALING TECHNOLOGY DISPOSABLE TISSUE SEALING DEVICE TAPTRONIC TRIGGER ACTIVATED POWER 5MM JAW STYLE: Brand: ENSEAL

## (undated) DEVICE — 2, DISPOSABLE SUCTION/IRRIGATOR WITH DISPOSABLE TIP: Brand: STRYKEFLOW

## (undated) DEVICE — GOWN,REINF,POLY,ECL,PP SLV,XXL: Brand: MEDLINE

## (undated) DEVICE — PROB ABL ENDOMTRL NOVASURE/G4 IMPEDENCE 1P/U

## (undated) DEVICE — ENDOCUT SCISSOR TIP, DISPOSABLE: Brand: RENEW

## (undated) DEVICE — ENDOPATH XCEL BLADELESS TROCARS WITH STABILITY SLEEVES: Brand: ENDOPATH XCEL

## (undated) DEVICE — GLV SURG PREMIERPRO MIC LTX PF SZ8 BRN

## (undated) DEVICE — PATIENT RETURN ELECTRODE, SINGLE-USE, CONTACT QUALITY MONITORING, ADULT, WITH 9FT CORD, FOR PATIENTS WEIGING OVER 33LBS. (15KG): Brand: MEGADYNE

## (undated) DEVICE — ENDOPATH XCEL UNIVERSAL TROCAR STABLILITY SLEEVES: Brand: ENDOPATH XCEL

## (undated) DEVICE — CYSTO/BLADDER IRRIGATION SET, REGULATING CLAMP

## (undated) DEVICE — UNDYED BRAIDED (POLYGLACTIN 910), SYNTHETIC ABSORBABLE SUTURE: Brand: COATED VICRYL

## (undated) DEVICE — SKIN AFFIX SURG ADHESIVE 72/CS 0.55ML: Brand: MEDLINE

## (undated) DEVICE — COR GYN LAPAROSCOPY: Brand: MEDLINE INDUSTRIES, INC.

## (undated) DEVICE — [HIGH FLOW INSUFFLATOR,  DO NOT USE IF PACKAGE IS DAMAGED,  KEEP DRY,  KEEP AWAY FROM SUNLIGHT,  PROTECT FROM HEAT AND RADIOACTIVE SOURCES.]: Brand: PNEUMOSURE

## (undated) DEVICE — DRSNG TELFA PAD NONADH STR 1S 3X4IN

## (undated) DEVICE — COR MINOR LITHOTOMY: Brand: MEDLINE INDUSTRIES, INC.